# Patient Record
Sex: MALE | Race: WHITE | Employment: FULL TIME | URBAN - METROPOLITAN AREA
[De-identification: names, ages, dates, MRNs, and addresses within clinical notes are randomized per-mention and may not be internally consistent; named-entity substitution may affect disease eponyms.]

---

## 2017-04-03 ENCOUNTER — OFFICE VISIT (OUTPATIENT)
Dept: FAMILY MEDICINE CLINIC | Age: 52
End: 2017-04-03

## 2017-04-03 VITALS
BODY MASS INDEX: 26.35 KG/M2 | OXYGEN SATURATION: 99 % | DIASTOLIC BLOOD PRESSURE: 58 MMHG | HEART RATE: 71 BPM | RESPIRATION RATE: 18 BRPM | SYSTOLIC BLOOD PRESSURE: 108 MMHG | TEMPERATURE: 97.7 F | HEIGHT: 73 IN | WEIGHT: 198.8 LBS

## 2017-04-03 DIAGNOSIS — J45.909 ALLERGIC ASTHMA, UNSPECIFIED ASTHMA SEVERITY, UNCOMPLICATED: ICD-10-CM

## 2017-04-03 DIAGNOSIS — Z00.00 ROUTINE GENERAL MEDICAL EXAMINATION AT A HEALTH CARE FACILITY: Primary | ICD-10-CM

## 2017-04-03 DIAGNOSIS — F32.A CHRONIC DEPRESSION: ICD-10-CM

## 2017-04-03 DIAGNOSIS — Z12.5 SCREENING PSA (PROSTATE SPECIFIC ANTIGEN): ICD-10-CM

## 2017-04-03 DIAGNOSIS — K27.9 PEPTIC ULCER DISEASE: ICD-10-CM

## 2017-04-03 DIAGNOSIS — E79.0 HYPERURICEMIA: ICD-10-CM

## 2017-04-03 NOTE — PROGRESS NOTES
HISTORY OF PRESENT ILLNESS  Elana Ross is a 46 y.o. male. HPI  Annual CPE, fasting for labs w/ physical form for work. Overall has been getting along well and feeling well in general.   No complaints or concerns at this time. Review of Systems   Constitutional: Negative. HENT: Negative. Eyes: Negative. Respiratory: Negative. Asthma under good control, seldom needs rescue inhaler since doing so well on maintenance meds   Cardiovascular: Negative. Gastrointestinal: Negative. Negative for abdominal pain, blood in stool, constipation, diarrhea, heartburn, melena, nausea and vomiting. H/o PUD doing well on Prilosec daily   Genitourinary: Negative. Denies nocturia, weak stream, dribbling or other sx   Musculoskeletal: Negative. Neurological: Negative. Psychiatric/Behavioral: Negative. Doing well on Prozac     Problem List  Date Reviewed: 4/3/2017          Codes Class Noted    Lumbar radiculitis ICD-10-CM: M54.16  ICD-9-CM: 724.4  6/3/2016        S/P epidural steroid injection ICD-10-CM: Z92.241  ICD-9-CM: V45.89  6/3/2016    Overview Signed 6/3/2016 12:05 PM by Eleni Mendez, DO     Left foraminal with Dr. Jian Avalos on 6/1/16             Allergic asthma ICD-10-CM: J45.909  ICD-9-CM: 493.00  2/10/2010        Peptic ulcer disease ICD-10-CM: K27.9  ICD-9-CM: 533.90  2/10/2010        Depression ICD-10-CM: F32.9  ICD-9-CM: 043  2/10/2010        Sleep apnea, obstructive ICD-10-CM: G47.33  ICD-9-CM: 327.23  2/10/2010        Gout ICD-10-CM: M10.9  ICD-9-CM: 274.9  2/10/2010            Past Surgical History:   Procedure Laterality Date    HX COLONOSCOPY  12/22/2014    Normal, follow up 10 yrs, Dr Dada Escobedo  2012    to stretch esophagus    HX VASECTOMY  12/2013     Current Outpatient Prescriptions   Medication Sig    celecoxib (CELEBREX) 100 mg capsule Take 1 Cap by mouth two (2) times a day.     omeprazole (PRILOSEC) 20 mg capsule TAKE ONE CAPSULE BY MOUTH EVERY DAY    montelukast (SINGULAIR) 10 mg tablet TAKE 1 TABLET DAILY    FLUoxetine (PROZAC) 20 mg capsule TAKE 1 CAPSULE DAILY    mometasone (ASMANEX TWISTHALER) 220 mcg (30 doses) inhaler TAKE 1 CAP BY INHALATION DAILY.  allopurinol (ZYLOPRIM) 100 mg tablet TAKE 1 TABLET TWICE A DAY (Patient taking differently: TAKE 1 TABLET DAilY)    saw palmetto 160 mg cap Take  by mouth.  fluticasone (FLONASE) 50 mcg/actuation nasal spray 2 Sprays by Both Nostrils route daily.  therapeutic multivitamin (THERAGRAN) tablet Take 1 Tab by mouth daily.  calcium 500 mg tab Take  by mouth.  Cholecalciferol, Vitamin D3, (VITAMIN D3) 1,000 unit cap Take  by mouth.  fexofenadine (ALLEGRA) 180 mg tablet Take  by mouth daily. No current facility-administered medications for this visit.       No Known Allergies  Social History     Social History    Marital status:      Spouse name: N/A    Number of children: N/A    Years of education: N/A     Occupational History     for [employer]      Social History Main Topics    Smoking status: Never Smoker    Smokeless tobacco: Never Used    Alcohol use 0.5 oz/week     1 Glasses of wine per week      Comment: maybe 1 glass of wine/beer a few times a week    Drug use: No    Sexual activity: Yes     Partners: Female     Birth control/ protection: Surgical     Other Topics Concern    Caffeine Concern No     ice tea 2 x a day, diek Coke once a day    Special Diet No    Back Care Yes     2016-1/2017: Dr Ana Yoon, [facility], PT     Exercise Yes     5 x a week: elliptical x 15 minutes, wts x 45 minutes     Social History Narrative    Wife Alice No is a PAFP of Dr Biju Grajeda     Immunization History   Administered Date(s) Administered    TDAP Vaccine 01/04/2011       Family History   Problem Relation Age of Onset    Diabetes Father     Cancer Mother      breast-mastectomy-2012    Stroke Maternal Aunt      Visit Vitals    /58 (BP 1 Location: Left arm, BP Patient Position: Sitting)    Pulse 71    Temp 97.7 °F (36.5 °C) (Oral)    Resp 18    Ht 6' 1\" (1.854 m)    Wt 198 lb 12.8 oz (90.2 kg)    SpO2 99%    BMI 26.23 kg/m2       Physical Exam   Constitutional: He is oriented to person, place, and time. He appears well-developed and well-nourished. No distress. HENT:   Right Ear: Tympanic membrane normal.   Left Ear: Tympanic membrane normal.   Nose: Nose normal.   Mouth/Throat: Oropharynx is clear and moist. No oropharyngeal exudate. Eyes: Conjunctivae and EOM are normal. Pupils are equal, round, and reactive to light. Neck: Neck supple. No JVD present. Carotid bruit is not present. No thyromegaly present. Cardiovascular: Normal rate, regular rhythm and normal heart sounds. No murmur heard. Pulmonary/Chest: Effort normal and breath sounds normal. No respiratory distress. Abdominal: Soft. Bowel sounds are normal. He exhibits no distension and no mass. There is no tenderness. Genitourinary:   Genitourinary Comments: Deferred    Musculoskeletal: Normal range of motion. He exhibits no edema or tenderness. Lymphadenopathy:     He has no cervical adenopathy. Neurological: He is alert and oriented to person, place, and time. Psychiatric: He has a normal mood and affect. His behavior is normal.   Vitals reviewed. ASSESSMENT and PLAN    ICD-10-CM ICD-9-CM    1. Routine general medical examination at a health care facility Z00.00 V70.0 CBC W/O DIFF      LIPID PANEL      METABOLIC PANEL, COMPREHENSIVE      HEMOGLOBIN A1C WITH EAG      TSH 3RD GENERATION      PROSTATE SPECIFIC AG (PSA)      URINALYSIS W/MICROSCOPIC      URIC ACID   2. Hyperuricemia E79.0 790.6 URIC ACID   3. Allergic asthma, unspecified asthma severity, uncomplicated, controlled, stable J45.909 493.00    4. Chronic depression, stable on Prozac F32.9 311    5. Peptic ulcer disease, controlled on Prilosec K27.9 533.90    6.  Screening PSA (prostate specific antigen) Z12.5 V76.44 PROSTATE SPECIFIC AG (PSA)     Fasting for labs w/ physical form for work which will be completed and faxed to number provided. Reviewed diet, exercise and weight maintenance  Cardiovascular risk and specific lipid/LDL goals reviewed  Reviewed medications and side effects; doing well on current regimen  Further follow up & other recommendations pending review of labs as well  If all remains good and stable, RTC 1 yr CPE.  Follow up sooner prn

## 2017-04-03 NOTE — PATIENT INSTRUCTIONS

## 2017-04-03 NOTE — PROGRESS NOTES
Chief Complaint   Patient presents with    Complete Physical     fasting      1. Have you been to the ER, urgent care clinic since your last visit? Hospitalized since your last visit? [facility] -laceration to left eyelid 8/2016    2. Have you seen or consulted any other health care providers outside of the [address] [place] [address] since your last visit? Include any pap smears or colon screening.    Yes Dr Nathan Motta for cortisone injections-[facility]

## 2017-04-04 LAB
ALBUMIN SERPL-MCNC: 4.6 G/DL (ref 3.5–5.5)
ALBUMIN/GLOB SERPL: 2.1 {RATIO} (ref 1.2–2.2)
ALP SERPL-CCNC: 87 IU/L (ref 39–117)
ALT SERPL-CCNC: 29 IU/L (ref 0–44)
APPEARANCE UR: CLEAR
AST SERPL-CCNC: 40 IU/L (ref 0–40)
BACTERIA #/AREA URNS HPF: NORMAL /[HPF]
BILIRUB SERPL-MCNC: 0.7 MG/DL (ref 0–1.2)
BILIRUB UR QL STRIP: NEGATIVE
BUN SERPL-MCNC: 13 MG/DL (ref 6–24)
BUN/CREAT SERPL: 12 (ref 9–20)
CALCIUM SERPL-MCNC: 9.5 MG/DL (ref 8.7–10.2)
CASTS URNS QL MICRO: NORMAL /LPF
CHLORIDE SERPL-SCNC: 100 MMOL/L (ref 96–106)
CHOLEST SERPL-MCNC: 172 MG/DL (ref 100–199)
CO2 SERPL-SCNC: 22 MMOL/L (ref 18–29)
COLOR UR: YELLOW
CREAT SERPL-MCNC: 1.09 MG/DL (ref 0.76–1.27)
EPI CELLS #/AREA URNS HPF: NORMAL /HPF
ERYTHROCYTE [DISTWIDTH] IN BLOOD BY AUTOMATED COUNT: 13.9 % (ref 12.3–15.4)
EST. AVERAGE GLUCOSE BLD GHB EST-MCNC: 114 MG/DL
GLOBULIN SER CALC-MCNC: 2.2 G/DL (ref 1.5–4.5)
GLUCOSE SERPL-MCNC: 77 MG/DL (ref 65–99)
GLUCOSE UR QL: NEGATIVE
HBA1C MFR BLD: 5.6 % (ref 4.8–5.6)
HCT VFR BLD AUTO: 41.5 % (ref 37.5–51)
HDLC SERPL-MCNC: 51 MG/DL
HGB BLD-MCNC: 13.8 G/DL (ref 12.6–17.7)
HGB UR QL STRIP: NEGATIVE
INTERPRETATION, 910389: NORMAL
KETONES UR QL STRIP: NEGATIVE
LDLC SERPL CALC-MCNC: 99 MG/DL (ref 0–99)
LEUKOCYTE ESTERASE UR QL STRIP: NEGATIVE
MCH RBC QN AUTO: 29.1 PG (ref 26.6–33)
MCHC RBC AUTO-ENTMCNC: 33.3 G/DL (ref 31.5–35.7)
MCV RBC AUTO: 87 FL (ref 79–97)
MICRO URNS: ABNORMAL
MICRO URNS: ABNORMAL
MUCOUS THREADS URNS QL MICRO: PRESENT
NITRITE UR QL STRIP: NEGATIVE
PH UR STRIP: 8 [PH] (ref 5–7.5)
PLATELET # BLD AUTO: 295 X10E3/UL (ref 150–379)
POTASSIUM SERPL-SCNC: 4.2 MMOL/L (ref 3.5–5.2)
PROT SERPL-MCNC: 6.8 G/DL (ref 6–8.5)
PROT UR QL STRIP: NEGATIVE
PSA SERPL-MCNC: 0.4 NG/ML (ref 0–4)
RBC # BLD AUTO: 4.75 X10E6/UL (ref 4.14–5.8)
RBC #/AREA URNS HPF: NORMAL /HPF
SODIUM SERPL-SCNC: 142 MMOL/L (ref 134–144)
SP GR UR: 1.02 (ref 1–1.03)
TRIGL SERPL-MCNC: 109 MG/DL (ref 0–149)
TSH SERPL DL<=0.005 MIU/L-ACNC: 1.55 UIU/ML (ref 0.45–4.5)
URATE SERPL-MCNC: 6 MG/DL (ref 3.7–8.6)
UROBILINOGEN UR STRIP-MCNC: 0.2 MG/DL (ref 0.2–1)
VLDLC SERPL CALC-MCNC: 22 MG/DL (ref 5–40)
WBC # BLD AUTO: 8.5 X10E3/UL (ref 3.4–10.8)
WBC #/AREA URNS HPF: NORMAL /HPF

## 2017-04-06 NOTE — PROGRESS NOTES
Advise pt lipids excellent  BS and HgBA1c good and normal. No diabetes.   Great news, so keep up the good work  Advise I have filled in physical form for work and we will fax that over today  Urine, blood counts, liver, kidney, thyroid, uric acid and prostate all good and wnl  Cont same meds  Fasting CPE 1 yr

## 2017-04-07 ENCOUNTER — TELEPHONE (OUTPATIENT)
Dept: FAMILY MEDICINE CLINIC | Age: 52
End: 2017-04-07

## 2017-04-07 NOTE — TELEPHONE ENCOUNTER
----- Message from Jordan Salter LPN sent at 7/5/3370  2:18 PM EDT -----  Regarding: FW: Test Results Question  Contact: [phone]      ----- Message -----     From: Toña Zavala     Sent: 4/7/2017  10:25 AM       To: [facility] Nurse Pool  Subject: Test Results Question                            Dr. Shivani Hardin, I forgot to ask you about testosterone levels. Was that something that was tested in my blood sample? Just curious if it was low. I seem to be more tired with less energy than I used to have. Do you have any thoughts on that as far as supplements or other alternatives? Thank you for your help.

## 2017-04-11 RX ORDER — FLUTICASONE PROPIONATE 50 MCG
2 SPRAY, SUSPENSION (ML) NASAL DAILY
Qty: 3 BOTTLE | Refills: 3 | Status: SHIPPED | OUTPATIENT
Start: 2017-04-11 | End: 2018-04-23 | Stop reason: SDUPTHER

## 2017-04-11 RX ORDER — FLUOXETINE HYDROCHLORIDE 20 MG/1
20 CAPSULE ORAL DAILY
Qty: 90 CAP | Refills: 3 | Status: SHIPPED | OUTPATIENT
Start: 2017-04-11 | End: 2018-05-29 | Stop reason: SDUPTHER

## 2017-04-11 NOTE — TELEPHONE ENCOUNTER
Patient saw FLAURO for a complete physical on 04/03/2017 did she agree to take him as a new patient?

## 2017-04-11 NOTE — TELEPHONE ENCOUNTER
From: Mariangel Zambrano  To: Jhonny Mercedes DO  Sent: 4/10/2017 9:47 AM EDT  Subject: Medication Renewal Request    Original authorizing provider: DO Mariangel Morocho would like a refill of the following medications:  fluticasone (FLONASE) 50 mcg/actuation nasal spray [Pennie Bartholomew DO]  FLUoxetine (PROZAC) 20 mg capsule Jhonny Mercedes DO]    Preferred pharmacy: [facility]:  Since Dr. Maximiliano Sidhu has left, I need a new family practice  and someone to request a refill with [facility] for the two prescriptions noted above.  Thanks

## 2017-04-12 NOTE — TELEPHONE ENCOUNTER
Thanks Shayla Beyer. Sure. He has been tossed around to 4 different PCPs in the past 6 years (Addie Mcallister, Katherine Perez), that is the least I can do is take him on if he wants. I am his wife's PCP. So if pt wants me to be PCP, I am happy to do so. Either way can advise him I am sending in his rx renewals x 1 year. Thanks.

## 2017-07-11 RX ORDER — OMEPRAZOLE 20 MG/1
CAPSULE, DELAYED RELEASE ORAL
Qty: 90 CAP | Refills: 3 | Status: SHIPPED | OUTPATIENT
Start: 2017-07-11 | End: 2018-07-23 | Stop reason: SDUPTHER

## 2017-07-11 NOTE — TELEPHONE ENCOUNTER
Received faxed refill request for this medication from the pharmacy that is on file. Requesting a 90 day supply.     omeprazole (PRILOSEC) 20 mg capsule  TAKE ONE CAPSULE BY MOUTH EVERY DAY, Normal, Disp-90 Cap, R-3

## 2017-08-09 ENCOUNTER — OFFICE VISIT (OUTPATIENT)
Dept: FAMILY MEDICINE CLINIC | Age: 52
End: 2017-08-09

## 2017-08-09 VITALS
TEMPERATURE: 97.9 F | WEIGHT: 201 LBS | SYSTOLIC BLOOD PRESSURE: 120 MMHG | HEIGHT: 73 IN | BODY MASS INDEX: 26.64 KG/M2 | HEART RATE: 51 BPM | DIASTOLIC BLOOD PRESSURE: 64 MMHG | OXYGEN SATURATION: 98 % | RESPIRATION RATE: 18 BRPM

## 2017-08-09 DIAGNOSIS — M65.4 DE QUERVAIN'S TENOSYNOVITIS, LEFT: ICD-10-CM

## 2017-08-09 DIAGNOSIS — M79.645 PAIN OF LEFT THUMB: Primary | ICD-10-CM

## 2017-08-09 NOTE — PROGRESS NOTES
HISTORY OF PRESENT ILLNESS   HPI  Right handed  w/ 2 month h/o aching pain in base of left thumb. Described as an aching pain. Constant. Rates 4-5/10 unless he does something to aggravate it the pain goes up to an 8/10. Aggravated by gripping, picking things up or turning/twisting a certain way. No redness or swelling. No stiffness, weakness or paresthesias. No preceding injury/trauma. He started [employer] lessons around the end of March and practices a lot. He has to use the left thumb a lot to play. He took a break from that about 2 weeks ago. He still however works out at [employer]. He takes Celebrex bid prn his back pain and has been using that at times. Other times he takes a few Advil for instance after working out and that helps. He has iced it periodically which also helps some. REVIEW OF SYMPTOMS     Review of Systems   Constitutional: Negative.     Neurological: Negative.            PROBLEM LIST/MEDICAL HISTORY      Problem List  Date Reviewed: 8/9/2017          Codes Class Noted    Lumbar radiculitis ICD-10-CM: M54.16  ICD-9-CM: 724.4  6/3/2016        S/P epidural steroid injection ICD-10-CM: Z92.241  ICD-9-CM: V45.89  6/3/2016    Overview Signed 6/3/2016 12:05 PM by Kalina Torres DO     Left foraminal with Dr. Conner Hassan on 6/1/16             Allergic asthma ICD-10-CM: J45.909  ICD-9-CM: 493.00  2/10/2010        Peptic ulcer disease ICD-10-CM: K27.9  ICD-9-CM: 533.90  2/10/2010        Depression ICD-10-CM: F32.9  ICD-9-CM: 319  2/10/2010        Sleep apnea, obstructive ICD-10-CM: G47.33  ICD-9-CM: 327.23  2/10/2010        Gout ICD-10-CM: M10.9  ICD-9-CM: 274.9  2/10/2010                  PAST SURGICAL HISTORY       Past Surgical History:   Procedure Laterality Date    HX COLONOSCOPY  12/22/2014    Normal, follow up 10 yrs, Dr Tutu Conway  2012    to stretch esophagus    HX VASECTOMY  12/2013         MEDICATIONS      Current Outpatient Prescriptions   Medication Sig  omeprazole (PRILOSEC) 20 mg capsule TAKE ONE CAPSULE BY MOUTH EVERY DAY    montelukast (SINGULAIR) 10 mg tablet Take 1 Tab by mouth daily.  celecoxib (CELEBREX) 100 mg capsule Take 1 Cap by mouth two (2) times a day.  fluticasone (FLONASE) 50 mcg/actuation nasal spray 2 Sprays by Both Nostrils route daily.  FLUoxetine (PROZAC) 20 mg capsule Take 1 Cap by mouth daily.  mometasone (ASMANEX TWISTHALER) 220 mcg (30 doses) inhaler TAKE 1 CAP BY INHALATION DAILY.  allopurinol (ZYLOPRIM) 100 mg tablet TAKE 1 TABLET TWICE A DAY (Patient taking differently: TAKE 1 TABLET DAilY)    therapeutic multivitamin (THERAGRAN) tablet Take 1 Tab by mouth daily.  calcium 500 mg tab Take  by mouth.  Cholecalciferol, Vitamin D3, (VITAMIN D3) 1,000 unit cap Take  by mouth. No current facility-administered medications for this visit.            ALLERGIES   No Known Allergies       SOCIAL HISTORY       Social History     Social History    Marital status:      Spouse name: N/A    Number of children: N/A    Years of education: N/A     Occupational History     for [employer]      Social History Main Topics    Smoking status: Never Smoker    Smokeless tobacco: Never Used    Alcohol use 0.5 oz/week     1 Glasses of wine per week      Comment: maybe 1 glass of wine/beer a few times a week    Drug use: No    Sexual activity: Yes     Partners: Female     Birth control/ protection: Surgical     Other Topics Concern    Caffeine Concern No     ice tea 2 x a day, diek Coke once a day    Special Diet No    Back Care Yes     2016-1/2017: Dr Isabela Laguna, [facility], PT     Exercise Yes     5 x a week: elliptical x 15 minutes, wts x 45 minutes     Social History Narrative    Wife Bianca Patiño is a PAFP of Dr Johanne Lieberman        IMMUNIZATIONS  Immunization History   Administered Date(s) Administered    TDAP Vaccine 01/04/2011         FAMILY HISTORY     Family History   Problem Relation Age of Onset    Diabetes Father  Cancer Mother      breast-mastectomy-2012    Stroke Maternal Aunt          VITALS     Visit Vitals    /64 (BP 1 Location: Left arm, BP Patient Position: Sitting)    Pulse (!) 51    Temp 97.9 °F (36.6 °C) (Oral)    Resp 18    Ht 6' 1\" (1.854 m)    Wt 201 lb (91.2 kg)    SpO2 98%    BMI 26.52 kg/m2          PHYSICAL EXAMINATION     Physical Exam   Constitutional: No distress. Cardiovascular: Intact distal pulses. Musculoskeletal:   Left thumb/wrist exam: No joint effusions, warmth, erythema, swelling or discoloration. Tender at base of left thumb along EPB/ APL tendons. +Finkelsteins.  intact and symmetric to the right   Vitals reviewed.          DIAGNOSTIC DATA      Study Result   EXAM:  XR THUMB LT MIN 2 V     INDICATION:   chronic left thumb pain. No injury.     COMPARISON: None.     FINDINGS: Three views of the left thumb demonstrate no fracture or other acute  osseous or articular abnormality. The soft tissues are within normal limits. Joints are within normal limits. Bone mineralization is within normal limits.     IMPRESSION  IMPRESSION:        Normal left thumb views            ASSESSMENT & PLAN       ICD-10-CM ICD-9-CM    1. Pain of left thumb M79.645 729.5 XR THUMB LT MIN 2 V   2. De Quervain's tenosynovitis, left M65.4 727.04      Order for thumb spica splint  Ice as directed  Resume his Celebrex 100 mg bid w/ food for now and taper as sx improve  Reassess at ~ 2-3 weeks, sooner prn  If symptoms worsen or fail to improve, will refer to hand ortho and this was d/w pt today  Patient counseled about diagnosis, assessment, treatment plan and expresses understanding with plan of care.    [facility] counseling and exercises d/w pt, handouts given

## 2017-08-09 NOTE — PATIENT INSTRUCTIONS
Maritza Sayres Disease: Exercises  Your Care Instructions  Here are some examples of typical rehabilitation exercises for your condition. Start each exercise slowly. Ease off the exercise if you start to have pain. Your doctor or your physical or occupational therapist will tell you when you can start these exercises and which ones will work best for you. How to do the exercises  Thumb lifts    1. Place your hand on a flat surface, with your palm up. 2. Lift your thumb away from your palm to make a \"C\" shape. 3. Hold for about 6 seconds. 4. Repeat 8 to 12 times. Passive thumb MP flexion    1. Hold your hand in front of you, and turn your hand so your little finger faces down and your thumb faces up. (Your hand should be in the position used for shaking someone's hand.) You may also rest your hand on a flat surface. 2. Use the fingers on your other hand to bend your thumb down at the point where your thumb connects to your palm. 3. Hold for at least 15 to 30 seconds. 4. Repeat 2 to 4 times. Finkelstein stretch    1. Hold your arms out in front of you. (Your hand should be in the position used for shaking someone's hand.)  2. Bend your thumb toward your palm. 3. Use your other hand to gently stretch your thumb and wrist downward until you feel the stretch on the thumb side of your wrist.  4. Hold for at least 15 to 30 seconds. 5. Repeat 2 to 4 times. Resisted ulnar deviation    Note: For this exercise, you will need elastic exercise material, such as surgical tubing or Thera-Band. 1. Sit leaning forward with your legs slightly spread and your elbow on your thigh. 2. Grasp one end of the band with your palm down, and step on the other end with the foot opposite the hand holding the band. 3. Slowly bend your wrist sideways and away from your knee. 4. Repeat 8 to 12 times. Follow-up care is a key part of your treatment and safety.  Be sure to make and go to all appointments, and call your doctor if you are having problems. It's also a good idea to know your test results and keep a list of the medicines you take. Where can you learn more? Go to http://kae-patric.info/. Enter U814 in the search box to learn more about \"De Quervain's Disease: Exercises. \"  Current as of: March 21, 2017  Content Version: 11.3  © 5513-4049 [employer]. Care instructions adapted under license by [employer] (which disclaims liability or warranty for this information). If you have questions about a medical condition or this instruction, always ask your healthcare professional. [address] any warranty or liability for your use of this information. Santino Poe Tenosynovitis: Care Instructions  Your Care Instructions  Santino Poe (say \"Dania\") tenosynovitis is a problem that makes the bottom of your thumb and the side of your wrist hurt. When you have de Quervain's, the ropey fiber (tendon) that helps move your thumb away from your fingers becomes swollen. You may have pain when you move your wrist or pick things up. You may hear a creaking sound when you move your wrist or thumb. Symptoms often get better in a few weeks with home care. Your doctor may want you to start some gentle stretching exercises once your symptoms are gone. Sometimes treatment with an injection or surgery is needed. Follow-up care is a key part of your treatment and safety. Be sure to make and go to all appointments, and call your doctor if you are having problems. It's also a good idea to know your test results and keep a list of the medicines you take. How can you care for yourself at home? · Until your symptoms are better, stop the activities that caused the pain. · Avoid moving the hand and wrist that hurt. · Follow your doctor's directions for wearing a splint to keep your thumb and wrist from moving. · Try ice or heat.   ¨ Put ice or a cold pack on your thumb and wrist for 10 to 20 minutes at a time. Put a thin cloth between the ice and your skin. ¨ You can use heat for 20 to 30 minutes, 2 or 3 times a day. Try using a heating pad, hot shower, or hot pack. · Ask your doctor if you can take an over-the-counter pain medicine, such as acetaminophen (Tylenol), ibuprofen (Advil, Motrin), or naproxen (Aleve). Be safe with medicines. Read and follow all instructions on the label. When should you call for help? Watch closely for changes in your health, and be sure to contact your doctor if:  · You are not getting better after 2 weeks. Where can you learn more? Go to http://kae-patric.info/. Enter G133 in the search box to learn more about \"De Quervain's Tenosynovitis: Care Instructions. \"  Current as of: May 23, 2016  Content Version: 11.3  © 5165-1822 [employer]. Care instructions adapted under license by [employer] (which disclaims liability or warranty for this information). If you have questions about a medical condition or this instruction, always ask your healthcare professional. [address] any warranty or liability for your use of this information.

## 2017-08-09 NOTE — MR AVS SNAPSHOT
Visit Information Date & Time Provider Department Dept. Phone Encounter #  
 8/9/2017 12:00 PM Iain Richard  [facility] [phone] [id number] Upcoming Health Maintenance Date Due INFLUENZA AGE 9 TO ADULT 8/1/2017 DTaP/Tdap/Td series (2 - Td) 1/4/2021 COLONOSCOPY 12/22/2024 Allergies as of 8/9/2017  Review Complete On: 8/9/2017 By: [address], [place]  
 No Known Allergies Current Immunizations  Reviewed on 4/3/2017 Name Date TDAP Vaccine 1/4/2011 Not reviewed this visit You Were Diagnosed With   
  
 Codes Comments Pain of left thumb    -  Primary ICD-10-CM: W50.824 ICD-9-CM: 729.5 De Quervain's tenosynovitis, left     ICD-10-CM: M65.4 ICD-9-CM: 727.04 Vitals BP Pulse Temp Resp Height(growth percentile) Weight(growth percentile) 120/64 (BP 1 Location: Left arm, BP Patient Position: Sitting) (!) 51 97.9 °F (36.6 °C) (Oral) 18 6' 1\" (1.854 m) 201 lb (91.2 kg) SpO2 BMI Smoking Status 98% 26.52 kg/m2 Never Smoker BMI and BSA Data Body Mass Index Body Surface Area  
 26.52 kg/m 2 2.17 m 2 Preferred Pharmacy Pharmacy Name Phone [address] [facility] [phone] Your Updated Medication List  
  
   
This list is accurate as of: 8/9/17 12:26 PM.  Always use your most recent med list.  
  
  
  
  
 allopurinol 100 mg tablet Commonly known as:  ZYLOPRIM  
TAKE 1 TABLET TWICE A DAY  
  
 calcium 500 mg Tab Take  by mouth. celecoxib 100 mg capsule Commonly known as:  CeleBREX Take 1 Cap by mouth two (2) times a day. FLUoxetine 20 mg capsule Commonly known as:  PROzac Take 1 Cap by mouth daily. fluticasone 50 mcg/actuation nasal spray Commonly known as:  Erich Steuben 2 Sprays by Both Nostrils route daily. mometasone 220 mcg (30 doses) inhaler Commonly known as:  ASMANEX TWISTHALER  
TAKE 1 CAP BY INHALATION DAILY. montelukast 10 mg tablet Commonly known as:  SINGULAIR Take 1 Tab by mouth daily. omeprazole 20 mg capsule Commonly known as:  PRILOSEC  
TAKE ONE CAPSULE BY MOUTH EVERY DAY  
  
 therapeutic multivitamin tablet Commonly known as:  [facility] Take 1 Tab by mouth daily. VITAMIN D3 1,000 unit Cap Generic drug:  cholecalciferol Take  by mouth. To-Do List   
 08/09/2017 Imaging:  XR THUMB LT MIN 2 V Patient Instructions Nabil Ellington Disease: Exercises Your Care Instructions Here are some examples of typical rehabilitation exercises for your condition. Start each exercise slowly. Ease off the exercise if you start to have pain. Your doctor or your physical or occupational therapist will tell you when you can start these exercises and which ones will work best for you. How to do the exercises Thumb lifts 1. Place your hand on a flat surface, with your palm up. 2. Lift your thumb away from your palm to make a \"C\" shape. 3. Hold for about 6 seconds. 4. Repeat 8 to 12 times. Passive thumb MP flexion 1. Hold your hand in front of you, and turn your hand so your little finger faces down and your thumb faces up. (Your hand should be in the position used for shaking someone's hand.) You may also rest your hand on a flat surface. 2. Use the fingers on your other hand to bend your thumb down at the point where your thumb connects to your palm. 3. Hold for at least 15 to 30 seconds. 4. Repeat 2 to 4 times. Finkelstein stretch 1. Hold your arms out in front of you. (Your hand should be in the position used for shaking someone's hand.) 2. Bend your thumb toward your palm. 3. Use your other hand to gently stretch your thumb and wrist downward until you feel the stretch on the thumb side of your wrist. 
4. Hold for at least 15 to 30 seconds. 5. Repeat 2 to 4 times. Resisted ulnar deviation Note: For this exercise, you will need elastic exercise material, such as surgical tubing or Thera-Band. 1. Sit leaning forward with your legs slightly spread and your elbow on your thigh. 2. Grasp one end of the band with your palm down, and step on the other end with the foot opposite the hand holding the band. 3. Slowly bend your wrist sideways and away from your knee. 4. Repeat 8 to 12 times. Follow-up care is a key part of your treatment and safety. Be sure to make and go to all appointments, and call your doctor if you are having problems. It's also a good idea to know your test results and keep a list of the medicines you take. Where can you learn more? Go to http://kae-patric.info/. Enter C432 in the search box to learn more about \"De Quervain's Disease: Exercises. \" Current as of: March 21, 2017 Content Version: 11.3 © 2274-2678 [employer]. Care instructions adapted under license by [employer] (which disclaims liability or warranty for this information). If you have questions about a medical condition or this instruction, always ask your healthcare professional. [address] any warranty or liability for your use of this information. Marilyn Viramontes Tenosynovitis: Care Instructions Your Care Instructions Marilyn Viramontes (say \"Dania\") tenosynovitis is a problem that makes the bottom of your thumb and the side of your wrist hurt. When you have de Quervain's, the ropey fiber (tendon) that helps move your thumb away from your fingers becomes swollen. You may have pain when you move your wrist or pick things up. You may hear a creaking sound when you move your wrist or thumb. Symptoms often get better in a few weeks with home care. Your doctor may want you to start some gentle stretching exercises once your symptoms are gone. Sometimes treatment with an injection or surgery is needed. Follow-up care is a key part of your treatment and safety. Be sure to make and go to all appointments, and call your doctor if you are having problems. It's also a good idea to know your test results and keep a list of the medicines you take. How can you care for yourself at home? · Until your symptoms are better, stop the activities that caused the pain. · Avoid moving the hand and wrist that hurt. · Follow your doctor's directions for wearing a splint to keep your thumb and wrist from moving. · Try ice or heat. ¨ Put ice or a cold pack on your thumb and wrist for 10 to 20 minutes at a time. Put a thin cloth between the ice and your skin. ¨ You can use heat for 20 to 30 minutes, 2 or 3 times a day. Try using a heating pad, hot shower, or hot pack. · Ask your doctor if you can take an over-the-counter pain medicine, such as acetaminophen (Tylenol), ibuprofen (Advil, Motrin), or naproxen (Aleve). Be safe with medicines. Read and follow all instructions on the label. When should you call for help? Watch closely for changes in your health, and be sure to contact your doctor if: 
· You are not getting better after 2 weeks. Where can you learn more? Go to http://kae-patric.info/. Enter Z963 in the search box to learn more about \"De Quervain's Tenosynovitis: Care Instructions. \" Current as of: May 23, 2016 Content Version: 11.3 © 2218-6115 [employer]in[employer] Incorporated. Care instructions adapted under license by [employer] (which disclaims liability or warranty for this information). If you have questions about a medical condition or this instruction, always ask your healthcare professional. [address] any warranty or liability for your use of this information. Introducing [facility]! Dear Laymon Showers: Thank you for requesting a [employer] account. Our records indicate that you already have an active [employer] account.   You can access your account anytime at [email or web address] Did you know that you can access your hospital and ER discharge instructions at any time in [employer]? You can also review all of your test results from your hospital stay or ER visit. Additional Information If you have questions, please visit the Frequently Asked Questions section of the [employer] website at [email or web address] Remember, Fanchimp is NOT to be used for urgent needs. For medical emergencies, dial 911. Now available from your iPhone and Android! Please provide this summary of care documentation to your next provider. Your primary care clinician is listed as DEVI AGUIRRE. If you have any questions after today's visit, please call [phone].

## 2017-08-09 NOTE — PROGRESS NOTES
Chief Complaint   Patient presents with    Finger Pain     left thumb pain for 2 months - constant - sometimes worse than others-states \" throbbing ache\" and difficult to  big items     1. Have you been to the ER, urgent care clinic since your last visit? Hospitalized since your last visit? No    2. Have you seen or consulted any other health care providers outside of the [address] [place] [address] since your last visit? Include any pap smears or colon screening.  No

## 2017-10-02 DIAGNOSIS — J45.20 ALLERGIC ASTHMA, MILD INTERMITTENT, UNCOMPLICATED: ICD-10-CM

## 2017-10-03 RX ORDER — ALLOPURINOL 100 MG/1
100 TABLET ORAL DAILY
Qty: 90 TAB | Refills: 2 | Status: SHIPPED | OUTPATIENT
Start: 2017-10-03 | End: 2018-10-29 | Stop reason: SDUPTHER

## 2017-10-03 NOTE — TELEPHONE ENCOUNTER
Jose Grant would like a refill of the following medications:   allopurinol (ZYLOPRIM) 100 mg tablet [Pennie Bartholomew DO]   mometasone (ASMANEX TWISTHALER) 220 mcg (30 doses) inhaler Yolanda Sanchez DO]     Preferred pharmacy: [place]     Comment:   I still need Dr. Florence Garcia to contact [facility] to renew my prescriptions for allopurinol and asmanex twisthaler as soon as possible please.  Thank you.

## 2017-10-30 ENCOUNTER — TELEPHONE (OUTPATIENT)
Dept: FAMILY MEDICINE CLINIC | Age: 52
End: 2017-10-30

## 2017-10-30 NOTE — TELEPHONE ENCOUNTER
Called [facility] to get approval for the celebrex. It was approved for 1 year. The case ID # is [id number].

## 2018-04-23 RX ORDER — FLUTICASONE PROPIONATE 50 MCG
SPRAY, SUSPENSION (ML) NASAL
Qty: 48 G | Refills: 3 | Status: SHIPPED | OUTPATIENT
Start: 2018-04-23 | End: 2020-09-24 | Stop reason: ALTCHOICE

## 2018-05-15 ENCOUNTER — OFFICE VISIT (OUTPATIENT)
Dept: FAMILY MEDICINE CLINIC | Age: 53
End: 2018-05-15

## 2018-05-15 VITALS
OXYGEN SATURATION: 98 % | BODY MASS INDEX: 26.74 KG/M2 | DIASTOLIC BLOOD PRESSURE: 64 MMHG | RESPIRATION RATE: 18 BRPM | SYSTOLIC BLOOD PRESSURE: 110 MMHG | TEMPERATURE: 98.4 F | HEIGHT: 73 IN | WEIGHT: 201.8 LBS | HEART RATE: 54 BPM

## 2018-05-15 DIAGNOSIS — G47.33 OSA (OBSTRUCTIVE SLEEP APNEA): ICD-10-CM

## 2018-05-15 DIAGNOSIS — F32.A CHRONIC DEPRESSION: ICD-10-CM

## 2018-05-15 DIAGNOSIS — Z12.5 SCREENING PSA (PROSTATE SPECIFIC ANTIGEN): ICD-10-CM

## 2018-05-15 DIAGNOSIS — E79.0 HYPERURICEMIA: ICD-10-CM

## 2018-05-15 DIAGNOSIS — Z00.00 ROUTINE GENERAL MEDICAL EXAMINATION AT A HEALTH CARE FACILITY: Primary | ICD-10-CM

## 2018-05-15 DIAGNOSIS — R53.82 CHRONIC FATIGUE: ICD-10-CM

## 2018-05-15 DIAGNOSIS — J45.30 MILD PERSISTENT ASTHMA WITHOUT COMPLICATION: ICD-10-CM

## 2018-05-15 DIAGNOSIS — K27.9 PEPTIC ULCER DISEASE: ICD-10-CM

## 2018-05-15 PROBLEM — M51.36 DDD (DEGENERATIVE DISC DISEASE), LUMBAR: Status: ACTIVE | Noted: 2018-05-15

## 2018-05-15 PROBLEM — G89.29 CHRONIC LOW BACK PAIN WITH LEFT-SIDED SCIATICA: Status: ACTIVE | Noted: 2018-05-15

## 2018-05-15 PROBLEM — M54.42 CHRONIC LOW BACK PAIN WITH LEFT-SIDED SCIATICA: Status: ACTIVE | Noted: 2018-05-15

## 2018-05-15 PROBLEM — M51.26 PROTRUSION OF LUMBAR INTERVERTEBRAL DISC: Status: ACTIVE | Noted: 2018-05-15

## 2018-05-15 RX ORDER — FEXOFENADINE HCL AND PSEUDOEPHEDRINE HCI 180; 240 MG/1; MG/1
1 TABLET, EXTENDED RELEASE ORAL DAILY
COMMUNITY
End: 2018-05-15

## 2018-05-15 RX ORDER — BISMUTH SUBSALICYLATE 262 MG
1 TABLET,CHEWABLE ORAL DAILY
COMMUNITY
End: 2022-03-30 | Stop reason: ALTCHOICE

## 2018-05-15 RX ORDER — LORATADINE 10 MG/1
10 TABLET ORAL DAILY
COMMUNITY
End: 2019-05-22

## 2018-05-15 NOTE — PROGRESS NOTES
HISTORY OF PRESENT ILLNESS   HPI  Annual CPE fasting and follow up on chronic conditions detailed below. Overall patient has been getting along well and feeling pretty good in general.  He has h/o chronic depression that has been well controlled on Prozac daily. Also has h/o ISMAEL diagnosed ~ 10 yrs ago but intolerant of CPAP (so not using). He does not feel that has changed or worsened to any degree and for the most part feels he rests well at night. Wife not complaining of loud snoring and no witnessed apnea. He has chronic \"low grade fatigue\" which doesn't interfere w/ his daily activities, social life, or exercise habits. He would like to have his testosterone level checked to be sure that is not contributing. He tends to have decreased libido in general, that has not worsened. However when being sexually active w/ wife he has no ED issues. He denies any  sx. He follows a pretty sensible diet and continues to exercise several days a week w/o any problems. Denies rest or exertional CP, SOB, cough, wheeze or other sx. He uses his Asmanex inhaler daily and with doing so he does not need to use Albuterol prior to exercise. He seldom needs the Albuterol any during the year. He takes Celebrex mostly just once a day and that keeps his chronic low back pain at a very tolerable level. On rare occasion he may take a second one later in the day. Taking Prilosec daily and being on the Celebrex keeps his PUD from flaring like it did in the past when he took high doses of Advil. Current regimen working well for him. No gout flares in a long time w/ taking the Allopurinol daily. REVIEW OF SYMPTOMS     Review of Systems   Constitutional: Negative for chills, fever and weight loss. HENT: Negative. Eyes: Negative. Respiratory: Negative. Cardiovascular: Negative. Gastrointestinal: Negative. Negative for abdominal pain, blood in stool, constipation, diarrhea, heartburn, nausea and vomiting. Genitourinary: Negative for dysuria, frequency, hematuria and urgency. Denies scrotal masses, urinary frequency, weak stream, nocturia or other  complaints. Skin: Negative. Neurological: Negative. Negative for weakness. Endo/Heme/Allergies: Positive for environmental allergies (takes either Claritin or Allegra daily year round to keep his allergies under control). Psychiatric/Behavioral: Negative for depression. The patient is not nervous/anxious and does not have insomnia. Doing well on Prozac daily           PROBLEM LIST/MEDICAL HISTORY      Problem List  Date Reviewed: 5/15/2018          Codes Class Noted    ISMAEL (obstructive sleep apnea) ICD-10-CM: C82.18  ICD-9-CM: 327.23  5/15/2018    Overview Signed 5/15/2018 12:09 PM by Tay Rodriguez MD     ~7646; Intolerant of CPAP             Hyperuricemia ICD-10-CM: E79.0  ICD-9-CM: 790.6  5/15/2018        Mild persistent asthma without complication JII-31-TT: A60.43  ICD-9-CM: 493.90  5/15/2018        Chronic low back pain with left-sided sciatica ICD-10-CM: M54.42, G89.29  ICD-9-CM: 724.2, 724.3, 338.29  5/15/2018    Overview Signed 5/15/2018  1:54 PM by Tay Rodriguez MD     MRI 2016: Mild multilevel disc and facet degenerative change focused at L5-S1. Moderate broad-based protrusion at L5-S1 with mild canal stenosis and mild   effacement of nerve is extending to both the right and left S1 foramina.                Protrusion of lumbar intervertebral disc ICD-10-CM: M51.26  ICD-9-CM: 722.10  5/15/2018    Overview Signed 5/15/2018  1:54 PM by Tay Rodriguez MD     MRI 2016: Mild multilevel disc and facet degenerative change focused at L5-S1. Moderate broad-based protrusion at L5-S1 with mild canal stenosis and mild   effacement of nerve is extending to both the right and left S1 foramina.              DDD (degenerative disc disease), lumbar ICD-10-CM: M51.36  ICD-9-CM: 722.52  5/15/2018        Lumbar radiculitis ICD-10-CM: M54.16  ICD-9-CM: 724.4  6/3/2016        S/P epidural steroid injection ICD-10-CM: Z92.241  ICD-9-CM: V45.89  6/3/2016    Overview Signed 6/3/2016 12:05 PM by Rosalba Mane DO     Left foraminal with Dr. Vibha Muller on 6/1/16             Allergic asthma ICD-10-CM: J45.909  ICD-9-CM: 493.00  2/10/2010        Peptic ulcer disease ICD-10-CM: K27.9  ICD-9-CM: 533.90  2/10/2010        Depression ICD-10-CM: F32.9  ICD-9-CM: 765  2/10/2010        Gout ICD-10-CM: M10.9  ICD-9-CM: 274.9  2/10/2010                  PAST SURGICAL HISTORY       Past Surgical History:   Procedure Laterality Date    HX COLONOSCOPY  12/22/2014    Normal, follow up 10 yrs, Dr Macho Morrow  2012    to stretch esophagus    HX VASECTOMY  12/2013         MEDICATIONS      Current Outpatient Prescriptions   Medication Sig    multivitamin (ONE A DAY) tablet Take 1 Tab by mouth daily.  loratadine (CLARITIN) 10 mg tablet Take 10 mg by mouth daily.  fluticasone (FLONASE) 50 mcg/actuation nasal spray USE 2 SPRAYS IN EACH NOSTRIL DAILY    montelukast (SINGULAIR) 10 mg tablet TAKE 1 TABLET DAILY    allopurinol (ZYLOPRIM) 100 mg tablet Take 1 Tab by mouth daily.  mometasone (ASMANEX TWISTHALER) 220 mcg (30 doses) inhaler TAKE 1 CAP BY INHALATION DAILY.  omeprazole (PRILOSEC) 20 mg capsule TAKE ONE CAPSULE BY MOUTH EVERY DAY    celecoxib (CELEBREX) 100 mg capsule Take 1 Cap by mouth two (2) times a day.  FLUoxetine (PROZAC) 20 mg capsule Take 1 Cap by mouth daily.  calcium 500 mg tab Take  by mouth daily.  Cholecalciferol, Vitamin D3, (VITAMIN D3) 1,000 unit cap Take 1,000 Units by mouth daily. No current facility-administered medications for this visit.            ALLERGIES   No Known Allergies       SOCIAL HISTORY       Social History     Social History    Marital status:      Spouse name: N/A    Number of children: N/A    Years of education: N/A     Occupational History     for [employer] Social History Main Topics    Smoking status: Never Smoker    Smokeless tobacco: Never Used    Alcohol use 0.5 oz/week     1 Glasses of wine per week      Comment: maybe 1 glass of wine/beer a few times a week    Drug use: No    Sexual activity: Yes     Partners: Female     Birth control/ protection: Surgical     Other Topics Concern    Caffeine Concern No     ice tea 2 x a day, diek Coke once a day    Special Diet No    Back Care Yes     2016-1/2017: Dr Cony Cooper, [facility], PT     Exercise Yes     5-6 x a week: elliptical x 15 minutes, wts x 45 minutes     Social History Narrative    Wife Kameron Power is a PAFP of Dr Jhony Lugo  Immunization History   Administered Date(s) Administered    TDAP Vaccine 01/04/2011         FAMILY HISTORY     Family History   Problem Relation Age of Onset    Diabetes Father     Cancer Mother 80     bladder cancer removed 1-2018    Breast Cancer Mother      mastectomy 2012    No Known Problems Sister     No Known Problems Brother     Stroke Maternal Aunt          VITALS     Visit Vitals    /64 (BP 1 Location: Right arm, BP Patient Position: Sitting)    Pulse (!) 54    Temp 98.4 °F (36.9 °C) (Oral)    Resp 18    Ht 6' 1\" (1.854 m)    Wt 201 lb 12.8 oz (91.5 kg)    SpO2 98%    BMI 26.62 kg/m2          PHYSICAL EXAMINATION     Physical Exam   Constitutional: He is oriented to person, place, and time and well-developed, well-nourished, and in no distress. HENT:   Nose: Nose normal.   Mouth/Throat: Oropharynx is clear and moist. No oropharyngeal exudate. Eyes: Conjunctivae and EOM are normal. Pupils are equal, round, and reactive to light. Neck: Neck supple. No JVD present. Carotid bruit is not present. No thyromegaly present. Cardiovascular: Normal rate, regular rhythm and normal heart sounds. No murmur heard. Pulmonary/Chest: Effort normal and breath sounds normal.   Abdominal: Soft.  Bowel sounds are normal. He exhibits no distension and no mass. There is no tenderness. Genitourinary:   Genitourinary Comments: Deferred    Musculoskeletal: Normal range of motion. He exhibits no edema or tenderness. Lymphadenopathy:     He has no cervical adenopathy. Neurological: He is alert and oriented to person, place, and time. He has normal reflexes. Gait normal. Coordination normal.   Skin: Skin is warm and dry. Psychiatric: Mood, affect and judgment normal.   Vitals reviewed.              ASSESSMENT & PLAN       ICD-10-CM ICD-9-CM    1. Routine general medical examination at a health care facility Z00.00 V70.0 CBC WITH AUTOMATED DIFF      LIPID PANEL      METABOLIC PANEL, COMPREHENSIVE      TSH 3RD GENERATION      URIC ACID      PSA W/ REFLX FREE PSA      TESTOSTERONE, FREE & TOTAL   2. Hyperuricemia E79.0 790.6 URIC ACID; cont Allopurinol   3. Chronic depression F32.9 311    4. Chronic fatigue R53.82 780.79 CBC WITH AUTOMATED DIFF      METABOLIC PANEL, COMPREHENSIVE      TSH 3RD GENERATION      TESTOSTERONE, FREE & TOTAL   5. ISMAEL (obstructive sleep apnea) G47.33 327.23 Intolerant of CPAP over the years. 6. Peptic ulcer disease K27.9 533.90 Stable on Prilosec daily   7. Screening PSA (prostate specific antigen) Z12.5 V76.44 PSA W/ REFLX FREE PSA   8. Mild persistent asthma without complication P22.34 651.94 Controlled on maintenance Asmanex     Fasting labs today  Patient brought in health form for work to be completed and faxed when labs back. Reviewed diet, nutrition, exercise, weight management, age/risk based screening recommendations, health maintenance & prevention counseling. Patient counseled about current BMI, goals, diet, nutrition, exercise, weight management. Nutrition/meal planning discussed. Monitor weights. Appropriate patient education materials included with or without corresponding AVS via handout or MyChart if activated. Reassess at next follow up visit.    Cardiovascular risk and specific lipid/LDL goals reviewed  Reviewed medications and side effects in detail. Doing well on current regimen. No changes at this time. Further follow up & other recommendations pending review of labs. TRT counseling. Discussed w/ pt that if T level is low, will have him RTC for repeat testosterone testing first thing in the AM at 8 as well as LH testing.   Otherwise if all remains good and stable, RTC 1 yr next annual CPE and sooner in the interim prn w/ periodic monitoring

## 2018-05-15 NOTE — PROGRESS NOTES
Chief Complaint   Patient presents with    Complete Physical     1. Have you been to the ER, urgent care clinic since your last visit? Hospitalized since your last visit? No    2. Have you seen or consulted any other health care providers outside of the [address] [place] [address] since your last visit? Include any pap smears or colon screening.    Yes Ortho for hand

## 2018-05-15 NOTE — PATIENT INSTRUCTIONS
Well Visit, Men 48 to 72: Care Instructions  Your Care Instructions    Physical exams can help you stay healthy. Your doctor has checked your overall health and may have suggested ways to take good care of yourself. He or she also may have recommended tests. At home, you can help prevent illness with healthy eating, regular exercise, and other steps. Follow-up care is a key part of your treatment and safety. Be sure to make and go to all appointments, and call your doctor if you are having problems. It's also a good idea to know your test results and keep a list of the medicines you take. How can you care for yourself at home? · Reach and stay at a healthy weight. This will lower your risk for many problems, such as obesity, diabetes, heart disease, and high blood pressure. · Get at least 30 minutes of exercise on most days of the week. Walking is a good choice. You also may want to do other activities, such as running, swimming, cycling, or playing tennis or team sports. · Do not smoke. Smoking can make health problems worse. If you need help quitting, talk to your doctor about stop-smoking programs and medicines. These can increase your chances of quitting for good. · Protect your skin from too much sun. When you're outdoors from 10 a.m. to 4 p.m., stay in the shade or cover up with clothing and a hat with a wide brim. Wear sunglasses that block UV rays. Even when it's cloudy, put broad-spectrum sunscreen (SPF 30 or higher) on any exposed skin. · See a dentist one or two times a year for checkups and to have your teeth cleaned. · Wear a seat belt in the car. · Limit alcohol to 2 drinks a day. Too much alcohol can cause health problems. Follow your doctor's advice about when to have certain tests. These tests can spot problems early. · Cholesterol.  Your doctor will tell you how often to have this done based on your overall health and other things that can increase your risk for heart attack and stroke. · Blood pressure. Have your blood pressure checked during a routine doctor visit. Your doctor will tell you how often to check your blood pressure based on your age, your blood pressure results, and other factors. · Prostate exam. Talk to your doctor about whether you should have a blood test (called a PSA test) for prostate cancer. Experts disagree on whether men should have this test. Some experts recommend that you discuss the benefits and risks of the test with your doctor. · Diabetes. Ask your doctor whether you should have tests for diabetes. · Vision. Some experts recommend that you have yearly exams for glaucoma and other age-related eye problems starting at age 48. · Hearing. Tell your doctor if you notice any change in your hearing. You can have tests to find out how well you hear. · Colon cancer. You should begin tests for colon cancer at age 48. You may have one of several tests. Your doctor will tell you how often to have tests based on your age and risk. Risks include whether you already had a precancerous polyp removed from your colon or whether your parent, brother, sister, or child has had colon cancer. · Heart attack and stroke risk. At least every 4 to 6 years, you should have your risk for heart attack and stroke assessed. Your doctor uses factors such as your age, blood pressure, cholesterol, and whether you smoke or have diabetes to show what your risk for a heart attack or stroke is over the next 10 years. · Abdominal aortic aneurysm. Ask your doctor whether you should have a test to check for an aneurysm. You may need a test if you ever smoked or if your parent, brother, sister, or child has had an aneurysm. When should you call for help? Watch closely for changes in your health, and be sure to contact your doctor if you have any problems or symptoms that concern you. Where can you learn more? Go to http://kae-patric.info/.   Enter Q494 in the search box to learn more about \"Well Visit, Men 48 to 72: Care Instructions. \"  Current as of: May 12, 2017  Content Version: 11.4  © 2087-9998 [employer]. Care instructions adapted under license by [employer] (which disclaims liability or warranty for this information). If you have questions about a medical condition or this instruction, always ask your healthcare professional. [address] any warranty or liability for your use of this information. Body Mass Index: Care Instructions  Your Care Instructions    Body mass index (BMI) can help you see if your weight is raising your risk for health problems. It uses a formula to compare how much you weigh with how tall you are. · A BMI lower than 18.5 is considered underweight. · A BMI between 18.5 and 24.9 is considered healthy. · A BMI between 25 and 29.9 is considered overweight. A BMI of 30 or higher is considered obese. If your BMI is in the normal range, it means that you have a lower risk for weight-related health problems. If your BMI is in the overweight or obese range, you may be at increased risk for weight-related health problems, such as high blood pressure, heart disease, stroke, arthritis or joint pain, and diabetes. If your BMI is in the underweight range, you may be at increased risk for health problems such as fatigue, lower protection (immunity) against illness, muscle loss, bone loss, hair loss, and hormone problems. BMI is just one measure of your risk for weight-related health problems. You may be at higher risk for health problems if you are not active, you eat an unhealthy diet, or you drink too much alcohol or use tobacco products. Follow-up care is a key part of your treatment and safety. Be sure to make and go to all appointments, and call your doctor if you are having problems. It's also a good idea to know your test results and keep a list of the medicines you take.   How can you care for yourself at home? · Practice healthy eating habits. This includes eating plenty of fruits, vegetables, whole grains, lean protein, and low-fat dairy. · If your doctor recommends it, get more exercise. Walking is a good choice. Bit by bit, increase the amount you walk every day. Try for at least 30 minutes on most days of the week. · Do not smoke. Smoking can increase your risk for health problems. If you need help quitting, talk to your doctor about stop-smoking programs and medicines. These can increase your chances of quitting for good. · Limit alcohol to 2 drinks a day for men and 1 drink a day for women. Too much alcohol can cause health problems. If you have a BMI higher than 25  · Your doctor may do other tests to check your risk for weight-related health problems. This may include measuring the distance around your waist. A waist measurement of more than 40 inches in men or 35 inches in women can increase the risk of weight-related health problems. · Talk with your doctor about steps you can take to stay healthy or improve your health. You may need to make lifestyle changes to lose weight and stay healthy, such as changing your diet and getting regular exercise. If you have a BMI lower than 18.5  · Your doctor may do other tests to check your risk for health problems. · Talk with your doctor about steps you can take to stay healthy or improve your health. You may need to make lifestyle changes to gain or maintain weight and stay healthy, such as getting more healthy foods in your diet and doing exercises to build muscle. Where can you learn more? Go to http://kae-patric.info/. Enter S176 in the search box to learn more about \"Body Mass Index: Care Instructions. \"  Current as of: October 13, 2016  Content Version: 11.4  © 6202-0218 [employer], [employer].  Care instructions adapted under license by [employer] (which disclaims liability or warranty for this information). If you have questions about a medical condition or this instruction, always ask your healthcare professional. [place] [address] any warranty or liability for your use of this information.

## 2018-05-16 ENCOUNTER — TELEPHONE (OUTPATIENT)
Dept: FAMILY MEDICINE CLINIC | Age: 53
End: 2018-05-16

## 2018-05-16 LAB
ALBUMIN SERPL-MCNC: 4.7 G/DL (ref 3.5–5.5)
ALBUMIN/GLOB SERPL: 2.1 {RATIO} (ref 1.2–2.2)
ALP SERPL-CCNC: 89 IU/L (ref 39–117)
ALT SERPL-CCNC: 21 IU/L (ref 0–44)
AST SERPL-CCNC: 27 IU/L (ref 0–40)
BASOPHILS # BLD AUTO: 0 X10E3/UL (ref 0–0.2)
BASOPHILS NFR BLD AUTO: 0 %
BILIRUB SERPL-MCNC: 0.8 MG/DL (ref 0–1.2)
BUN SERPL-MCNC: 18 MG/DL (ref 6–24)
BUN/CREAT SERPL: 15 (ref 9–20)
CALCIUM SERPL-MCNC: 9.5 MG/DL (ref 8.7–10.2)
CHLORIDE SERPL-SCNC: 100 MMOL/L (ref 96–106)
CHOLEST SERPL-MCNC: 177 MG/DL (ref 100–199)
CO2 SERPL-SCNC: 24 MMOL/L (ref 18–29)
CREAT SERPL-MCNC: 1.21 MG/DL (ref 0.76–1.27)
EOSINOPHIL # BLD AUTO: 0.2 X10E3/UL (ref 0–0.4)
EOSINOPHIL NFR BLD AUTO: 2 %
ERYTHROCYTE [DISTWIDTH] IN BLOOD BY AUTOMATED COUNT: 13.9 % (ref 12.3–15.4)
GFR SERPLBLD CREATININE-BSD FMLA CKD-EPI: 67 ML/MIN/1.73
GFR SERPLBLD CREATININE-BSD FMLA CKD-EPI: 78 ML/MIN/1.73
GLOBULIN SER CALC-MCNC: 2.2 G/DL (ref 1.5–4.5)
GLUCOSE SERPL-MCNC: 89 MG/DL (ref 65–99)
HCT VFR BLD AUTO: 42.3 % (ref 37.5–51)
HDLC SERPL-MCNC: 48 MG/DL
HGB BLD-MCNC: 13.7 G/DL (ref 13–17.7)
IMM GRANULOCYTES # BLD: 0 X10E3/UL (ref 0–0.1)
IMM GRANULOCYTES NFR BLD: 0 %
INTERPRETATION, 910389: NORMAL
LDLC SERPL CALC-MCNC: 108 MG/DL (ref 0–99)
LYMPHOCYTES # BLD AUTO: 2 X10E3/UL (ref 0.7–3.1)
LYMPHOCYTES NFR BLD AUTO: 23 %
MCH RBC QN AUTO: 28.2 PG (ref 26.6–33)
MCHC RBC AUTO-ENTMCNC: 32.4 G/DL (ref 31.5–35.7)
MCV RBC AUTO: 87 FL (ref 79–97)
MONOCYTES # BLD AUTO: 0.7 X10E3/UL (ref 0.1–0.9)
MONOCYTES NFR BLD AUTO: 8 %
NEUTROPHILS # BLD AUTO: 5.6 X10E3/UL (ref 1.4–7)
NEUTROPHILS NFR BLD AUTO: 67 %
PLATELET # BLD AUTO: 318 X10E3/UL (ref 150–379)
POTASSIUM SERPL-SCNC: 4.2 MMOL/L (ref 3.5–5.2)
PROT SERPL-MCNC: 6.9 G/DL (ref 6–8.5)
PSA SERPL-MCNC: 0.4 NG/ML (ref 0–4)
RBC # BLD AUTO: 4.86 X10E6/UL (ref 4.14–5.8)
REFLEX CRITERIA: NORMAL
SODIUM SERPL-SCNC: 140 MMOL/L (ref 134–144)
TESTOST FREE SERPL-MCNC: 7.2 PG/ML (ref 7.2–24)
TESTOST SERPL-MCNC: 809 NG/DL (ref 264–916)
TRIGL SERPL-MCNC: 105 MG/DL (ref 0–149)
TSH SERPL DL<=0.005 MIU/L-ACNC: 1.38 UIU/ML (ref 0.45–4.5)
URATE SERPL-MCNC: 5.8 MG/DL (ref 3.7–8.6)
VLDLC SERPL CALC-MCNC: 21 MG/DL (ref 5–40)
WBC # BLD AUTO: 8.5 X10E3/UL (ref 3.4–10.8)

## 2018-05-16 NOTE — TELEPHONE ENCOUNTER
CBC, liver, kidney, thyroid, uric acid all good and normal.  BS good and normal non diabetes range  Cholesterol numbers overall pretty good. PSA wnl and stable. Testosterone level good and normal range.   Advise pt we faxed over his health form to fax number provided  CPE 1 yr

## 2018-05-29 RX ORDER — FLUOXETINE HYDROCHLORIDE 20 MG/1
CAPSULE ORAL
Qty: 90 CAP | Refills: 3 | Status: SHIPPED | OUTPATIENT
Start: 2018-05-29 | End: 2019-07-22 | Stop reason: SDUPTHER

## 2018-07-23 RX ORDER — MONTELUKAST SODIUM 10 MG/1
TABLET ORAL
Qty: 90 TAB | Refills: 0 | Status: SHIPPED | OUTPATIENT
Start: 2018-07-23 | End: 2018-10-29 | Stop reason: SDUPTHER

## 2018-07-24 RX ORDER — OMEPRAZOLE 20 MG/1
CAPSULE, DELAYED RELEASE ORAL
Qty: 90 CAP | Refills: 3 | Status: SHIPPED | OUTPATIENT
Start: 2018-07-24 | End: 2021-08-02 | Stop reason: SDUPTHER

## 2018-09-10 DIAGNOSIS — J45.20 ALLERGIC ASTHMA, MILD INTERMITTENT, UNCOMPLICATED: ICD-10-CM

## 2018-09-11 RX ORDER — MOMETASONE FUROATE 220 UG/1
INHALANT RESPIRATORY (INHALATION)
Qty: 3 EACH | Refills: 2 | Status: SHIPPED | OUTPATIENT
Start: 2018-09-11 | End: 2019-10-03 | Stop reason: SDUPTHER

## 2018-09-11 RX ORDER — CELECOXIB 100 MG/1
CAPSULE ORAL
Qty: 180 CAP | Refills: 2 | Status: SHIPPED | OUTPATIENT
Start: 2018-09-11 | End: 2019-05-22

## 2018-10-29 RX ORDER — MONTELUKAST SODIUM 10 MG/1
TABLET ORAL
Qty: 90 TAB | Refills: 0 | Status: SHIPPED | OUTPATIENT
Start: 2018-10-29 | End: 2019-02-08 | Stop reason: SDUPTHER

## 2018-10-29 RX ORDER — ALLOPURINOL 100 MG/1
TABLET ORAL
Qty: 90 TAB | Refills: 2 | Status: SHIPPED | OUTPATIENT
Start: 2018-10-29 | End: 2020-01-22

## 2019-01-24 ENCOUNTER — TELEPHONE (OUTPATIENT)
Dept: FAMILY MEDICINE CLINIC | Age: 54
End: 2019-01-24

## 2019-01-24 NOTE — TELEPHONE ENCOUNTER
Fax from [employer]. clebrex not covered pt needs to try either etodolac caps, tabs or ER tabs. Ibuprofen, meloxicam or naproxen.

## 2019-01-24 NOTE — TELEPHONE ENCOUNTER
Please let pt know that we got a notice from [facility] that Celebrex is no longer going to be covered and that we need to try him on one of the alternatives they have listed. Has he tried/failed any of these before so I can determine which might be the best option for him? Also was he needing to take the Celebrex on a daily basis and if so once or twice a day?

## 2019-01-24 NOTE — TELEPHONE ENCOUNTER
BIANCA for return call. I called pt to let him know that insurance isn't covering the celebrex. Pt states that he thinks that he will do fine with just taking OTC advil. He will call us back if he changes his mind.

## 2019-02-08 RX ORDER — MONTELUKAST SODIUM 10 MG/1
TABLET ORAL
Qty: 90 TAB | Refills: 0 | Status: SHIPPED | OUTPATIENT
Start: 2019-02-08 | End: 2019-06-04 | Stop reason: SDUPTHER

## 2019-05-22 ENCOUNTER — OFFICE VISIT (OUTPATIENT)
Dept: FAMILY MEDICINE CLINIC | Age: 54
End: 2019-05-22

## 2019-05-22 VITALS
HEIGHT: 73 IN | BODY MASS INDEX: 26.37 KG/M2 | OXYGEN SATURATION: 98 % | WEIGHT: 199 LBS | DIASTOLIC BLOOD PRESSURE: 74 MMHG | TEMPERATURE: 97.8 F | SYSTOLIC BLOOD PRESSURE: 118 MMHG | RESPIRATION RATE: 18 BRPM | HEART RATE: 57 BPM

## 2019-05-22 DIAGNOSIS — J45.30 MILD PERSISTENT ASTHMA WITHOUT COMPLICATION: ICD-10-CM

## 2019-05-22 DIAGNOSIS — Z12.5 SCREENING PSA (PROSTATE SPECIFIC ANTIGEN): ICD-10-CM

## 2019-05-22 DIAGNOSIS — Z23 ENCOUNTER FOR IMMUNIZATION: ICD-10-CM

## 2019-05-22 DIAGNOSIS — E79.0 HYPERURICEMIA: ICD-10-CM

## 2019-05-22 DIAGNOSIS — Z00.00 ROUTINE GENERAL MEDICAL EXAMINATION AT A HEALTH CARE FACILITY: Primary | ICD-10-CM

## 2019-05-22 PROBLEM — K22.2 GERD WITH STRICTURE: Status: ACTIVE | Noted: 2019-05-22

## 2019-05-22 PROBLEM — K21.9 GERD WITH STRICTURE: Status: ACTIVE | Noted: 2019-05-22

## 2019-05-22 NOTE — PROGRESS NOTES
Chief Complaint   Patient presents with    Complete Physical     Fasting, health form for work     HISTORY OF PRESENT ILLNESS   HPI  Annual CPE fasting. Brought in form for labs to be completed and faxed for work. Overall has been getting along well and feeling well in general.  Sensible diet and exercises 3-5 x a week. Wt stable over time. Doing well on current medication regimen. REVIEW OF SYMPTOMS   Review of Systems   Constitutional: Negative. HENT: Negative. Eyes: Negative. Respiratory: Negative. Negative for cough, shortness of breath and wheezing. Asthma stable x years on maintenance Singulair and Asmanex once daily. He has not needed a rescue inhaler for many years. Cardiovascular: Negative. Negative for chest pain, palpitations and leg swelling. Gastrointestinal: Negative. Negative for abdominal pain, blood in stool, constipation, diarrhea, melena, nausea and vomiting. Takes Prilosec daily. In past when trying to wean off, he gets back gerd/burning/chest pain and increased swallowing difficulty. Symptoms remain stable on Prilosec   Genitourinary: Negative. Denies weak urine stream or nocturia. Musculoskeletal:        His insurance would no longer cover Celebrex so he has been off it x months and doing ok w/o it. He has not even needed to take anything otc. Had thumb surgery , PT/OT has helped. Getting a lot better. Back in the gym regularly since 1-2019. Relatively pain free. No gout flares. Doing well on Allopurinol. Neurological: Negative. Endo/Heme/Allergies: Negative. Negative for environmental allergies (well controlled on Allegra daily, Singulair daily and prn Flonase a few x a week). Psychiatric/Behavioral: Negative.             PROBLEM LIST/MEDICAL HISTORY     Problem List  Date Reviewed: 5/22/2019          Codes Class Noted    GERD with stricture ICD-10-CM: K21.9, K22.2  ICD-9-CM: 530.81, 530.3  5/22/2019    Overview Signed 5/22/2019 11:23 AM by Paulie Rojo MD     EGD, esophageal dilatation ~2012             ISMAEL (obstructive sleep apnea) ICD-10-CM: G47.33  ICD-9-CM: 327.23  5/15/2018    Overview Signed 5/15/2018 12:09 PM by Paulie Rojo MD     ~4811; Intolerant of CPAP             Hyperuricemia ICD-10-CM: E79.0  ICD-9-CM: 790.6  5/15/2018        Mild persistent asthma without complication [facility]-BE: D84.70  ICD-9-CM: 493.90  5/15/2018        Chronic low back pain with left-sided sciatica ICD-10-CM: M54.42, G89.29  ICD-9-CM: 724.2, 724.3, 338.29  5/15/2018    Overview Signed 5/15/2018  1:54 PM by Paulie Rojo MD     MRI 2016: Mild multilevel disc and facet degenerative change focused at L5-S1. Moderate broad-based protrusion at L5-S1 with mild canal stenosis and mild   effacement of nerve is extending to both the right and left S1 foramina.                Protrusion of lumbar intervertebral disc ICD-10-CM: M51.26  ICD-9-CM: 722.10  5/15/2018    Overview Signed 5/15/2018  1:54 PM by Paulie Rojo MD     MRI 2016: Mild multilevel disc and facet degenerative change focused at L5-S1. Moderate broad-based protrusion at L5-S1 with mild canal stenosis and mild   effacement of nerve is extending to both the right and left S1 foramina.              DDD (degenerative disc disease), lumbar ICD-10-CM: M51.36  ICD-9-CM: 722.52  5/15/2018        Lumbar radiculitis ICD-10-CM: M54.16  ICD-9-CM: 724.4  6/3/2016        S/P epidural steroid injection ICD-10-CM: Z92.241  ICD-9-CM: V45.89  6/3/2016    Overview Signed 6/3/2016 12:05 PM by Mara Hercules     Left foraminal with Dr. Elyssa Shaikh on 6/1/16             Allergic asthma ICD-10-CM: J45.909  ICD-9-CM: 493.00  2/10/2010        Peptic ulcer disease ICD-10-CM: K27.9  ICD-9-CM: 533.90  2/10/2010        Depression ICD-10-CM: F32.9  ICD-9-CM: 416  2/10/2010        Gout ICD-10-CM: M10.9  ICD-9-CM: 274.9  2/10/2010                  PAST SURGICAL HISTORY     Past Surgical History:   Procedure Laterality Date    HX COLONOSCOPY  12/22/2014    Normal, follow up 10 yrs, Dr Ama Calixto HX ENDOSCOPY  2012    esophageal dilatation     HX ORTHOPAEDIC Left 11/12/2018    Left Thumb/CMC Joint repair, Dr Mery Alfred, [facility]    HX VASECTOMY  12/2013         MEDICATIONS     Current Outpatient Medications   Medication Sig    fexofenadine HCl (ALLEGRA PO) Take 180 mg by mouth daily.  calcium carbonate/vitamin D3 (CALCIUM+D PO) Take  by mouth daily.  ferrous sulfate (IRON PO) Take  by mouth daily. Self started 1-2019    montelukast (SINGULAIR) 10 mg tablet TAKE 1 TABLET DAILY    allopurinol (ZYLOPRIM) 100 mg tablet TAKE 1 TABLET DAILY    ASMANEX TWISTHALER 220 mcg (30 doses) inhaler USE 1 INHALATION DAILY (DISCARD 45 DAYS AFTER OPENING)    omeprazole (PRILOSEC) 20 mg capsule TAKE 1 CAPSULE DAILY    FLUoxetine (PROZAC) 20 mg capsule TAKE 1 CAPSULE DAILY    multivitamin (ONE A DAY) tablet Take 1 Tab by mouth daily.  fluticasone (FLONASE) 50 mcg/actuation nasal spray USE 2 SPRAYS IN EACH NOSTRIL DAILY     No current facility-administered medications for this visit. ALLERGIES   No Known Allergies       SOCIAL HISTORY     Social History     Socioeconomic History    Marital status:      Spouse name: Not on file    Number of children: Not on file    Years of education: Not on file    Highest education level: Not on file   Occupational History    Occupation:  for [employer]    Smoking status: Never Smoker    Smokeless tobacco: Never Used   Substance and Sexual Activity    Alcohol use:  Yes     Alcohol/week: 0.5 oz     Types: 1 Glasses of wine per week     Comment: maybe 1 glass of wine/beer a few times a week    Drug use: No    Sexual activity: Yes     Partners: Female     Birth control/protection: Surgical   Other Topics Concern    Caffeine Concern No     Comment: ice tea 2 x a day, diek Coke once a day    Weight Concern No     Comment: stable over the years    Special Diet No    Back Care Yes     Comment: 2016-1/2017: Dr Gaurav Regan, [facility], PT     Exercise Yes     Comment: restarted 1-2019: cardio 3-5 x a week elliptical x 20 minutes, stationary bike x 40 minutes, 4 days a week does wts x 1 hr   Social History Narrative    Wife Amy Rodriguez is a PAFP of Dr Byron Castle  Immunization History   Administered Date(s) Administered    TDAP Vaccine 01/04/2011         FAMILY HISTORY     Family History   Problem Relation Age of Onset    Diabetes Father     Cancer Mother 80        bladder cancer removed 1-2018    Breast Cancer Mother         mastectomy 2012    No Known Problems Sister     No Known Problems Brother     Stroke Maternal Aunt          VITALS     Visit Vitals  /74 (BP 1 Location: Right arm, BP Patient Position: Sitting)   Pulse (!) 57   Temp 97.8 °F (36.6 °C) (Oral)   Resp 18   Ht 6' 1\" (1.854 m)   Wt 199 lb (90.3 kg)   SpO2 98%   BMI 26.25 kg/m²          PHYSICAL EXAMINATION   Physical Exam   Constitutional: He is oriented to person, place, and time and well-developed, well-nourished, and in no distress. HENT:   Right Ear: Tympanic membrane normal.   Left Ear: Tympanic membrane normal.   Nose: Nose normal.   Mouth/Throat: Oropharynx is clear and moist. No oropharyngeal exudate. Eyes: Pupils are equal, round, and reactive to light. Conjunctivae and EOM are normal.   Neck: Neck supple. Carotid bruit is not present. No thyromegaly present. Cardiovascular: Normal rate, regular rhythm and normal heart sounds. No murmur heard. Pulmonary/Chest: Effort normal and breath sounds normal. No respiratory distress. He has no wheezes. Abdominal: Soft. Bowel sounds are normal. He exhibits no distension and no mass. There is no tenderness. Waist measurement 38.5\"   Genitourinary:   Genitourinary Comments: Deferred    Musculoskeletal: Normal range of motion. He exhibits no edema or tenderness.    Lymphadenopathy:     He has no cervical adenopathy. Neurological: He is alert and oriented to person, place, and time. Gait normal.   Skin: Skin is warm and dry. Psychiatric: Mood and affect normal.   Vitals reviewed. LABORATORY DATA     Results for orders placed or performed in visit on 05/15/18   CBC WITH AUTOMATED DIFF   Result Value Ref Range    WBC 8.5 3.4 - 10.8 x10E3/uL    RBC 4.86 4.14 - 5.80 x10E6/uL    HGB 13.7 13.0 - 17.7 g/dL    HCT 42.3 37.5 - 51.0 %    MCV 87 79 - 97 fL    MCH 28.2 26.6 - 33.0 pg    MCHC 32.4 31.5 - 35.7 g/dL    RDW 13.9 12.3 - 15.4 %    PLATELET 740 571 - 892 x10E3/uL    NEUTROPHILS 67 Not Estab. %    Lymphocytes 23 Not Estab. %    MONOCYTES 8 Not Estab. %    EOSINOPHILS 2 Not Estab. %    BASOPHILS 0 Not Estab. %    ABS. NEUTROPHILS 5.6 1.4 - 7.0 x10E3/uL    Abs Lymphocytes 2.0 0.7 - 3.1 x10E3/uL    ABS. MONOCYTES 0.7 0.1 - 0.9 x10E3/uL    ABS. EOSINOPHILS 0.2 0.0 - 0.4 x10E3/uL    ABS. BASOPHILS 0.0 0.0 - 0.2 x10E3/uL    IMMATURE GRANULOCYTES 0 Not Estab. %    ABS. IMM. GRANS. 0.0 0.0 - 0.1 x10E3/uL   LIPID PANEL   Result Value Ref Range    Cholesterol, total 177 100 - 199 mg/dL    Triglyceride 105 0 - 149 mg/dL    HDL Cholesterol 48 >39 mg/dL    VLDL, calculated 21 5 - 40 mg/dL    LDL, calculated 108 (H) 0 - 99 mg/dL   METABOLIC PANEL, COMPREHENSIVE   Result Value Ref Range    Glucose 89 65 - 99 mg/dL    BUN 18 6 - 24 mg/dL    Creatinine 1.21 0.76 - 1.27 mg/dL    GFR est non-AA 67 >59 mL/min/1.73    GFR est AA 78 >59 mL/min/1.73    BUN/Creatinine ratio 15 9 - 20    Sodium 140 134 - 144 mmol/L    Potassium 4.2 3.5 - 5.2 mmol/L    Chloride 100 96 - 106 mmol/L    CO2 24 18 - 29 mmol/L    Calcium 9.5 8.7 - 10.2 mg/dL    Protein, total 6.9 6.0 - 8.5 g/dL    Albumin 4.7 3.5 - 5.5 g/dL    GLOBULIN, TOTAL 2.2 1.5 - 4.5 g/dL    A-G Ratio 2.1 1.2 - 2.2    Bilirubin, total 0.8 0.0 - 1.2 mg/dL    Alk.  phosphatase 89 39 - 117 IU/L    AST (SGOT) 27 0 - 40 IU/L    ALT (SGPT) 21 0 - 44 IU/L   TSH 3RD GENERATION Result Value Ref Range    TSH 1.380 0.450 - 4.500 uIU/mL   URIC ACID   Result Value Ref Range    Uric acid 5.8 3.7 - 8.6 mg/dL   PSA W/ REFLX FREE PSA   Result Value Ref Range    Prostate Specific Ag 0.4 0.0 - 4.0 ng/mL    Reflex Criteria Comment    TESTOSTERONE, FREE & TOTAL   Result Value Ref Range    Testosterone 809 264 - 916 ng/dL    Free testosterone (Direct) 7.2 7.2 - 24.0 pg/mL   CVD REPORT   Result Value Ref Range    INTERPRETATION Note           ASSESSMENT & PLAN       ICD-10-CM ICD-9-CM    1. Routine general medical examination at a health care facility Z00.00 V70.0 CBC W/O DIFF      LIPID PANEL      METABOLIC PANEL, COMPREHENSIVE      TSH 3RD GENERATION      URIC ACID      VITAMIN D, 25 HYDROXY   2. Hyperuricemia E79.0 790.6 URIC ACID   3. Mild persistent asthma without complication, controlled, stable J45.30 493.90    4. Screening PSA (prostate specific antigen) Z12.5 V76.44 PSA W/ REFLX FREE PSA   5. Encounter for immunization Z23 V03.89 PNEUMOCOCCAL POLYSACCHARIDE VACCINE, 23-VALENT, ADULT OR IMMUNOSUPPRESSED PT DOSE,      [place] IMMUNIZ ADMIN,1 SINGLE/COMB VAC/TOXOID     Fasting labs today  Will complete health form when labs available and fax signed copy to number provided  Cardiovascular risk and specific lipid/LDL goals reviewed  Reviewed diet, nutrition, exercise, weight management, BMI/goals, age/risk based screening recommendations, health maintenance & prevention counseling. Cancer screening USPTFS guidelines reviewed w/ pt today. Discussed benefits/positive/negative outcomes of screening based on age/risk stratification. Informed consent for/against screening based on pt's personal hx/risk factors. Updated in history above and health maintenance. Reviewed medications, effects, risks, benefits, potential short/long term effects and side effects  Doing well on current regimen  Further follow up & other recommendations pending review of labs.  If all remains good and stable, follow up in 1 yr, sooner prn

## 2019-05-22 NOTE — PROGRESS NOTES
Chief Complaint   Patient presents with    Complete Physical     fasting       1. Have you been to the ER, urgent care clinic since your last visit? Hospitalized since your last visit? No    2. Have you seen or consulted any other health care providers outside of the [address] [place] [address] since your last visit? Include any pap smears or colon screening.  Patient had surgery on left hand in November at [facility]

## 2019-05-22 NOTE — PATIENT INSTRUCTIONS
Well Visit, Men 48 to 72: Care Instructions  Your Care Instructions    Physical exams can help you stay healthy. Your doctor has checked your overall health and may have suggested ways to take good care of yourself. He or she also may have recommended tests. At home, you can help prevent illness with healthy eating, regular exercise, and other steps. Follow-up care is a key part of your treatment and safety. Be sure to make and go to all appointments, and call your doctor if you are having problems. It's also a good idea to know your test results and keep a list of the medicines you take. How can you care for yourself at home? · Reach and stay at a healthy weight. This will lower your risk for many problems, such as obesity, diabetes, heart disease, and high blood pressure. · Get at least 30 minutes of exercise on most days of the week. Walking is a good choice. You also may want to do other activities, such as running, swimming, cycling, or playing tennis or team sports. · Do not smoke. Smoking can make health problems worse. If you need help quitting, talk to your doctor about stop-smoking programs and medicines. These can increase your chances of quitting for good. · Protect your skin from too much sun. When you're outdoors from 10 a.m. to 4 p.m., stay in the shade or cover up with clothing and a hat with a wide brim. Wear sunglasses that block UV rays. Even when it's cloudy, put broad-spectrum sunscreen (SPF 30 or higher) on any exposed skin. · See a dentist one or two times a year for checkups and to have your teeth cleaned. · Wear a seat belt in the car. · Limit alcohol to 2 drinks a day. Too much alcohol can cause health problems. Follow your doctor's advice about when to have certain tests. These tests can spot problems early. · Cholesterol.  Your doctor will tell you how often to have this done based on your overall health and other things that can increase your risk for heart attack and stroke. · Blood pressure. Have your blood pressure checked during a routine doctor visit. Your doctor will tell you how often to check your blood pressure based on your age, your blood pressure results, and other factors. · Prostate exam. Talk to your doctor about whether you should have a blood test (called a PSA test) for prostate cancer. Experts disagree on whether men should have this test. Some experts recommend that you discuss the benefits and risks of the test with your doctor. · Diabetes. Ask your doctor whether you should have tests for diabetes. · Vision. Some experts recommend that you have yearly exams for glaucoma and other age-related eye problems starting at age 48. · Hearing. Tell your doctor if you notice any change in your hearing. You can have tests to find out how well you hear. · Colon cancer. You should begin tests for colon cancer at age 48. You may have one of several tests. Your doctor will tell you how often to have tests based on your age and risk. Risks include whether you already had a precancerous polyp removed from your colon or whether your parent, brother, sister, or child has had colon cancer. · Heart attack and stroke risk. At least every 4 to 6 years, you should have your risk for heart attack and stroke assessed. Your doctor uses factors such as your age, blood pressure, cholesterol, and whether you smoke or have diabetes to show what your risk for a heart attack or stroke is over the next 10 years. · Abdominal aortic aneurysm. Ask your doctor whether you should have a test to check for an aneurysm. You may need a test if you ever smoked or if your parent, brother, sister, or child has had an aneurysm. When should you call for help? Watch closely for changes in your health, and be sure to contact your doctor if you have any problems or symptoms that concern you. Where can you learn more? Go to http://kae-patric.info/.   Enter J245 in the search box to learn more about \"Well Visit, Men 48 to 72: Care Instructions. \"  Current as of: March 28, 2018  Content Version: 11.9  © 3362-7859 [employer]. Care instructions adapted under license by [employer] (which disclaims liability or warranty for this information). If you have questions about a medical condition or this instruction, always ask your healthcare professional. [address] any warranty or liability for your use of this information. Pneumococcal Polysaccharide Vaccine: What You Need to Know  Why get vaccinated? Vaccination can protect older adults (and some children and younger adults) from pneumococcal disease. Pneumococcal disease is caused by bacteria that can spread from person to person through close contact. It can cause ear infections, and it can also lead to more serious infections of the:  · Lungs (pneumonia),  · Blood (bacteremia), and  · Covering of the brain and spinal cord (meningitis). Meningitis can cause deafness and brain damage, and it can be fatal.  Anyone can get pneumococcal disease, but children under 3years of age, people with certain medical conditions, adults over 72years of age, and cigarette smokers are at the highest risk. About 18,000 older adults die each year from pneumococcal disease in the [place]. Treatment of pneumococcal infections with penicillin and other drugs used to be more effective. But some strains of the disease have become resistant to these drugs. This makes prevention of the disease, through vaccination, even more important. Pneumococcal polysaccharide vaccine (PPSV23)  Pneumococcal polysaccharide vaccine (PPSV23) protects against 23 types of pneumococcal bacteria. It will not prevent all pneumococcal disease.   PPSV23 is recommended for:  · All adults 72years of age and older,  · Anyone 2 through 59years of age with certain long-term health problems,  · Anyone 2 through 59years of age with a weakened immune system,  · Adults 19 through 59years of age who smoke cigarettes or have asthma. Most people need only one dose of PPSV. A second dose is recommended for certain high-risk groups. People 72 and older should get a dose even if they have gotten one or more doses of the vaccine before they turned 65. Your healthcare provider can give you more information about these recommendations. Most healthy adults develop protection within 2 to 3 weeks of getting the shot. Some people should not get this vaccine  · Anyone who has had a life-threatening allergic reaction to PPSV should not get another dose. · Anyone who has a severe allergy to any component of PPSV should not receive it. Tell your provider if you have any severe allergies. · Anyone who is moderately or severely ill when the shot is scheduled may be asked to wait until they recover before getting the vaccine. Someone with a mild illness can usually be vaccinated. · Children less than 3years of age should not receive this vaccine. · There is no evidence that PPSV is harmful to either a pregnant woman or to her fetus. However, as a precaution, women who need the vaccine should be vaccinated before becoming pregnant, if possible. Risks of a vaccine reaction  With any medicine, including vaccines, there is a chance of side effects. These are usually mild and go away on their own, but serious reactions are also possible. About half of people who get PPSV have mild side effects, such as redness or pain where the shot is given, which go away within about two days. Less than 1 out of 100 people develop a fever, muscle aches, or more severe local reactions. Problems that could happen after any vaccine:  · People sometimes faint after a medical procedure, including vaccination. Sitting or lying down for about 15 minutes can help prevent fainting, and injuries caused by a fall.  Tell your doctor if you feel dizzy, or have vision changes or ringing in the ears. · Some people get severe pain in the shoulder and have difficulty moving the arm where a shot was given. This happens very rarely. · Any medication can cause a severe allergic reaction. Such reactions from a vaccine are very rare, estimated at about 1 in a million doses, and would happen within a few minutes to a few hours after the vaccination. As with any medicine, there is a very remote chance of a vaccine causing a serious injury or death. The safety of vaccines is always being monitored. For more information, visit: www.cdc.gov/vaccinesafety/  What if there is a serious reaction? What should I look for? Look for anything that concerns you, such as signs of a severe allergic reaction, very high fever, or unusual behavior. Signs of a severe allergic reaction can include hives, swelling of the face and throat, difficulty breathing, a fast heartbeat, dizziness, and weakness. These would usually start a few minutes to a few hours after the vaccination. What should I do? If you think it is a severe allergic reaction or other emergency that can't wait, call 9-1-1 or get to the nearest hospital. Otherwise, call your doctor. Afterward, the reaction should be reported to the Vaccine Adverse Event Reporting System (VAERS). Your doctor might file this report, or you can do it yourself through the VAERS web site at www.vaers. hhs.gov, or by calling 8-101.767.2502. VAERS does not give medical advice. How can I learn more? · Ask your doctor. He or she can give you the vaccine package insert or suggest other sources of information. · Call your local or state health department. · Contact the Centers for Disease Control and Prevention (CDC):  ? Call 8-459.868.1501 (1-800-CDC-INFO) or  ?  Visit CDC's website at www.cdc.gov/vaccines  Vaccine Information Statement  PPSV Vaccine  (04/24/2015)  Department of Health and Human Services  Centers for Disease Control and Prevention  Many Vaccine Information Statements are available in Chinese and other languages. See www.immunize.org/vis. Hojas de información Sobre Vacunas están disponibles en español y en muchos otros idiomas. Visite Nasim.si. Care instructions adapted under license by [employer] (which disclaims liability or warranty for this information). If you have questions about a medical condition or this instruction, always ask your healthcare professional. [address] any warranty or liability for your use of this information.

## 2019-05-23 LAB
25(OH)D3+25(OH)D2 SERPL-MCNC: 33.3 NG/ML (ref 30–100)
ALBUMIN SERPL-MCNC: 4.6 G/DL (ref 3.5–5.5)
ALBUMIN/GLOB SERPL: 2.1 {RATIO} (ref 1.2–2.2)
ALP SERPL-CCNC: 92 IU/L (ref 39–117)
ALT SERPL-CCNC: 20 IU/L (ref 0–44)
AST SERPL-CCNC: 25 IU/L (ref 0–40)
BILIRUB SERPL-MCNC: 0.9 MG/DL (ref 0–1.2)
BUN SERPL-MCNC: 16 MG/DL (ref 6–24)
BUN/CREAT SERPL: 15 (ref 9–20)
CALCIUM SERPL-MCNC: 9.6 MG/DL (ref 8.7–10.2)
CHLORIDE SERPL-SCNC: 102 MMOL/L (ref 96–106)
CHOLEST SERPL-MCNC: 181 MG/DL (ref 100–199)
CO2 SERPL-SCNC: 22 MMOL/L (ref 20–29)
CREAT SERPL-MCNC: 1.1 MG/DL (ref 0.76–1.27)
ERYTHROCYTE [DISTWIDTH] IN BLOOD BY AUTOMATED COUNT: 14 % (ref 12.3–15.4)
GLOBULIN SER CALC-MCNC: 2.2 G/DL (ref 1.5–4.5)
GLUCOSE SERPL-MCNC: 83 MG/DL (ref 65–99)
HCT VFR BLD AUTO: 40.6 % (ref 37.5–51)
HDLC SERPL-MCNC: 49 MG/DL
HGB BLD-MCNC: 13.6 G/DL (ref 13–17.7)
INTERPRETATION, 910389: NORMAL
LDLC SERPL CALC-MCNC: 111 MG/DL (ref 0–99)
MCH RBC QN AUTO: 28.3 PG (ref 26.6–33)
MCHC RBC AUTO-ENTMCNC: 33.5 G/DL (ref 31.5–35.7)
MCV RBC AUTO: 85 FL (ref 79–97)
PLATELET # BLD AUTO: 298 X10E3/UL (ref 150–450)
POTASSIUM SERPL-SCNC: 4.2 MMOL/L (ref 3.5–5.2)
PROT SERPL-MCNC: 6.8 G/DL (ref 6–8.5)
PSA SERPL-MCNC: 0.4 NG/ML (ref 0–4)
RBC # BLD AUTO: 4.8 X10E6/UL (ref 4.14–5.8)
REFLEX CRITERIA: NORMAL
SODIUM SERPL-SCNC: 140 MMOL/L (ref 134–144)
TRIGL SERPL-MCNC: 103 MG/DL (ref 0–149)
TSH SERPL DL<=0.005 MIU/L-ACNC: 1.12 UIU/ML (ref 0.45–4.5)
URATE SERPL-MCNC: 7.5 MG/DL (ref 3.7–8.6)
VLDLC SERPL CALC-MCNC: 21 MG/DL (ref 5–40)
WBC # BLD AUTO: 7.8 X10E3/UL (ref 3.4–10.8)

## 2019-06-04 RX ORDER — MONTELUKAST SODIUM 10 MG/1
TABLET ORAL
Qty: 90 TAB | Refills: 0 | Status: SHIPPED | OUTPATIENT
Start: 2019-06-04 | End: 2019-09-20 | Stop reason: SDUPTHER

## 2019-06-15 ENCOUNTER — TELEPHONE (OUTPATIENT)
Dept: FAMILY MEDICINE CLINIC | Age: 54
End: 2019-06-15

## 2019-06-16 NOTE — TELEPHONE ENCOUNTER
Regarding: FW: Non-Urgent Medical Question  Contact: [phone]  Let me know what to tell him  ----- Message -----  From: Huy Longoria  Sent: 6/14/2019   2:29 PM  To: [facility] Nurse Pool  Subject: Non-Urgent Medical Question                      ----- Message from [address] [place] [address] 951, Generic sent at 6/14/2019  2:29 PM EDT -----    Same question from last time. I made an appointment last night, but cancelled because the bumps were not showing very well. They are inflamed in the morning when I wake up. They were not as bad this morning but I tried to take some pictures.   Let me know.      ------6/13/19 7:12 AM  For the past couple/few weeks I have been waking up with itching small clusters of bumps/welts (looks like group of mosquito bites) on my elbows, knees and the backs of my thighs.  At first I thought they were mosquito bites, but I know they are not now. [facility] does not have any at all.  I have been putting cortazone, anti biotic crème to help with the itching.  It seems to settle down during the day, but when I wake up it is back and irritated again.  I have not changed anything in my daily life to think it could be an allergic reaction to something new.  The only new thing was the pneumococcal (spelling?) shot that I received a few weeks ago. Kade Mealing advise. Lilliana Chung

## 2019-06-16 NOTE — TELEPHONE ENCOUNTER
I cant see the pictures very clearly, they are blurred. Best to see in person. I do not think it is related to the pneumonia shot he got however, but would need to see him for better assessment.   If not cleared up by the time you talk to him, you can book him w/ me

## 2019-06-17 NOTE — TELEPHONE ENCOUNTER
I called pt. He said that he didn't have any spots today. He is scheduled for tomorrow am.  He said that the takes allegra most days(mostly takes it M-F tends to forget on the weekend). He took an extra benadryl yesterday. He said that it started ~ memorial day when he was out of town. He knows when it is going to flare. It starts of feeling itchy then he will get a red spot that will turn into what looks like a mosquito bite. He will cancel appt tomorrow if he doesn't have a flare.

## 2019-06-18 ENCOUNTER — OFFICE VISIT (OUTPATIENT)
Dept: FAMILY MEDICINE CLINIC | Age: 54
End: 2019-06-18

## 2019-06-18 VITALS
BODY MASS INDEX: 26.8 KG/M2 | HEIGHT: 73 IN | OXYGEN SATURATION: 97 % | TEMPERATURE: 97.9 F | WEIGHT: 202.25 LBS | HEART RATE: 60 BPM | DIASTOLIC BLOOD PRESSURE: 82 MMHG | RESPIRATION RATE: 18 BRPM | SYSTOLIC BLOOD PRESSURE: 122 MMHG

## 2019-06-18 DIAGNOSIS — L50.9 URTICARIA: Primary | ICD-10-CM

## 2019-06-18 NOTE — PROGRESS NOTES
Chief Complaint   Patient presents with    Itching     all over body, welts popping up at times all over, tried otc cortisone creams       HISTORY OF PRESENT ILLNESS   HPI  Patient started ~ 5-25-19 w/ hives popping up various places on his body while staying at his brother's house visiting in [place]. Areas come and go involving his arms, elbows, dorsal hands, behind knees, thighs, ankles and back. Face, palms and soles are unaffected. They look like red blotches that welt up and when he scratches it leaves red streaks on his skin. He has applied cortizone cream which helps some temporarily and antibiotic cream which doesn't seem to do anything. He takes Allegra daily year round anyway for his environmental allergies. One night he took a Benadryl which helped suppress it so he could rest.  Aside from staying at his brothers and getting the PPSV 23 on 5-22-19, he has not had anything new or different. No change in his own soaps, lotions, detergents or other household products. No new meds aside from the shot. No inciting foods that he can specify. No other family members affected. Aside from this he feels perfectly well. REVIEW OF SYMPTOMS   Review of Systems   Constitutional: Negative for chills and fever. HENT: Negative. Eyes: Negative. Respiratory: Negative. PROBLEM LIST/MEDICAL HISTORY     Problem List  Date Reviewed: 6/18/2019          Codes Class Noted    GERD with stricture ICD-10-CM: K21.9, K22.2  ICD-9-CM: 530.81, 530.3  5/22/2019    Overview Signed 5/22/2019 11:23 AM by Debra Valladares MD     EGD, esophageal dilatation ~2012             ISMAEL (obstructive sleep apnea) ICD-10-CM: C44.52  ICD-9-CM: 327.23  5/15/2018    Overview Signed 5/15/2018 12:09 PM by Debra Valladares MD     ~4672;  Intolerant of CPAP             Hyperuricemia ICD-10-CM: E79.0  ICD-9-CM: 790.6  5/15/2018        Mild persistent asthma without complication NGN-[id number]: A14.16  ICD-9-CM: 493.90  5/15/2018        Chronic low back pain with left-sided sciatica ICD-10-CM: M54.42, G89.29  ICD-9-CM: 724.2, 724.3, 338.29  5/15/2018    Overview Signed 5/15/2018  1:54 PM by Laurent Lucero MD     MRI 2016: Mild multilevel disc and facet degenerative change focused at L5-S1. Moderate broad-based protrusion at L5-S1 with mild canal stenosis and mild   effacement of nerve is extending to both the right and left S1 foramina.                Protrusion of lumbar intervertebral disc ICD-10-CM: M51.26  ICD-9-CM: 722.10  5/15/2018    Overview Signed 5/15/2018  1:54 PM by Laurent Lucero MD     MRI 2016: Mild multilevel disc and facet degenerative change focused at L5-S1. Moderate broad-based protrusion at L5-S1 with mild canal stenosis and mild   effacement of nerve is extending to both the right and left S1 foramina.              DDD (degenerative disc disease), lumbar ICD-10-CM: M51.36  ICD-9-CM: 722.52  5/15/2018        Lumbar radiculitis ICD-10-CM: M54.16  ICD-9-CM: 724.4  6/3/2016        S/P epidural steroid injection ICD-10-CM: Z92.241  ICD-9-CM: V45.89  6/3/2016    Overview Signed 6/3/2016 12:05 PM by Kenia Arnett     Left foraminal with Dr. Mally Rossi on 6/1/16             Allergic asthma ICD-10-CM: J45.909  ICD-9-CM: 493.00  2/10/2010        Peptic ulcer disease ICD-10-CM: K27.9  ICD-9-CM: 533.90  2/10/2010        Depression ICD-10-CM: F32.9  ICD-9-CM: 521  2/10/2010        Gout ICD-10-CM: M10.9  ICD-9-CM: 274.9  2/10/2010                  PAST SURGICAL HISTORY     Past Surgical History:   Procedure Laterality Date    HX COLONOSCOPY  12/22/2014    Normal, follow up 10 yrs, Dr Ilda Gillis HX ENDOSCOPY  2012    esophageal dilatation     HX ORTHOPAEDIC Left 11/12/2018    Left Thumb/CMC Joint repair, Dr Sue Currie, Johann Handley    HX VASECTOMY  12/2013         MEDICATIONS     Current Outpatient Medications   Medication Sig    montelukast (SINGULAIR) 10 mg tablet TAKE 1 TABLET DAILY    fexofenadine HCl (ALLEGRA PO) Take 180 mg by mouth daily.  calcium carbonate/vitamin D3 (CALCIUM+D PO) Take  by mouth daily.  ferrous sulfate (IRON PO) Take  by mouth daily. Self started 1-2019    allopurinol (ZYLOPRIM) 100 mg tablet TAKE 1 TABLET DAILY    ASMANEX TWISTHALER 220 mcg (30 doses) inhaler USE 1 INHALATION DAILY (DISCARD 45 DAYS AFTER OPENING)    omeprazole (PRILOSEC) 20 mg capsule TAKE 1 CAPSULE DAILY    FLUoxetine (PROZAC) 20 mg capsule TAKE 1 CAPSULE DAILY    multivitamin (ONE A DAY) tablet Take 1 Tab by mouth daily.  fluticasone (FLONASE) 50 mcg/actuation nasal spray USE 2 SPRAYS IN EACH NOSTRIL DAILY     No current facility-administered medications for this visit. ALLERGIES   No Known Allergies       SOCIAL HISTORY     Social History     Socioeconomic History    Marital status:      Spouse name: Not on file    Number of children: Not on file    Years of education: Not on file    Highest education level: Not on file   Occupational History    Occupation:  for [employer] Use    Smoking status: Never Smoker    Smokeless tobacco: Never Used   Substance and Sexual Activity    Alcohol use:  Yes     Alcohol/week: 0.5 oz     Types: 1 Glasses of wine per week     Comment: maybe 1 glass of wine/beer a few times a week    Drug use: No    Sexual activity: Yes     Partners: Female     Birth control/protection: Surgical   Other Topics Concern    Caffeine Concern No     Comment: ice tea 2 x a day, diek Coke once a day    Weight Concern No     Comment: stable over the years    Special Diet No    Back Care Yes     Comment: 2016-1/2017: Dr Jose Gauthier, [facility], PT     Exercise Yes     Comment: restarted 1-2019: cardio 3-5 x a week elliptical x 20 minutes, stationary bike x 40 minutes, 4 days a week does wts x 1 hr   Social History Narrative    Wife Lucero Kidd is a PAFP of Dr Laine Upton  Immunization History   Administered Date(s) Administered    Pneumococcal Polysaccharide (PPSV-23) 05/22/2019    TDAP Vaccine 01/04/2011         FAMILY HISTORY     Family History   Problem Relation Age of Onset    Diabetes Father     Cancer Mother 80        bladder cancer removed 1-2018    Breast Cancer Mother         mastectomy 2012    No Known Problems Sister     No Known Problems Brother     Stroke Maternal Aunt          VITALS     Visit Vitals  /82 (BP 1 Location: Right arm, BP Patient Position: Sitting)   Pulse 60   Temp 97.9 °F (36.6 °C) (Oral)   Resp 18   Ht 6' 1\" (1.854 m)   Wt 202 lb 4 oz (91.7 kg)   SpO2 97%   BMI 26.68 kg/m²          PHYSICAL EXAMINATION   Physical Exam   Constitutional: He is well-developed, well-nourished, and in no distress. Cardiovascular: Normal rate and regular rhythm. Pulmonary/Chest: Effort normal and breath sounds normal. No respiratory distress. Skin: Skin is warm. Currently in office today the affected area involves B thighs: there are a few scattered lightly erythematous to pink urticarial wheals and welts. +Dermatographism. No generalized edema. No vesicles. No pustules. Vitals reviewed. LABORATORY DATA     Results for orders placed or performed in visit on 05/22/19   CBC W/O DIFF   Result Value Ref Range    WBC 7.8 3.4 - 10.8 x10E3/uL    RBC 4.80 4. 14 - 5.80 x10E6/uL    HGB 13.6 13.0 - 17.7 g/dL    HCT 40.6 37.5 - 51.0 %    MCV 85 79 - 97 fL    MCH 28.3 26.6 - 33.0 pg    MCHC 33.5 31.5 - 35.7 g/dL    RDW 14.0 12.3 - 15.4 %    PLATELET 171 096 - 148 x10E3/uL   LIPID PANEL   Result Value Ref Range    Cholesterol, total 181 100 - 199 mg/dL    Triglyceride 103 0 - 149 mg/dL    HDL Cholesterol 49 >39 mg/dL    VLDL, calculated 21 5 - 40 mg/dL    LDL, calculated 111 (H) 0 - 99 mg/dL   METABOLIC PANEL, COMPREHENSIVE   Result Value Ref Range    Glucose 83 65 - 99 mg/dL    BUN 16 6 - 24 mg/dL    Creatinine 1.10 0.76 - 1.27 mg/dL    GFR est non-AA 75 >59 mL/min/1.73    GFR est AA 87 >59 mL/min/1.73    BUN/Creatinine ratio 15 9 - 20    Sodium 140 134 - 144 mmol/L    Potassium 4.2 3.5 - 5.2 mmol/L    Chloride 102 96 - 106 mmol/L    CO2 22 20 - 29 mmol/L    Calcium 9.6 8.7 - 10.2 mg/dL    Protein, total 6.8 6.0 - 8.5 g/dL    Albumin 4.6 3.5 - 5.5 g/dL    GLOBULIN, TOTAL 2.2 1.5 - 4.5 g/dL    A-G Ratio 2.1 1.2 - 2.2    Bilirubin, total 0.9 0.0 - 1.2 mg/dL    Alk. phosphatase 92 39 - 117 IU/L    AST (SGOT) 25 0 - 40 IU/L    ALT (SGPT) 20 0 - 44 IU/L   TSH 3RD GENERATION   Result Value Ref Range    TSH 1.120 0.450 - 4.500 uIU/mL   URIC ACID   Result Value Ref Range    Uric acid 7.5 3.7 - 8.6 mg/dL   VITAMIN D, 25 HYDROXY   Result Value Ref Range    VITAMIN D, 25-HYDROXY 33.3 30.0 - 100.0 ng/mL   PSA W/ REFLX FREE PSA   Result Value Ref Range    Prostate Specific Ag 0.4 0.0 - 4.0 ng/mL    Reflex Criteria Comment    CVD REPORT   Result Value Ref Range    INTERPRETATION Note           ASSESSMENT & PLAN       ICD-10-CM ICD-9-CM    1. Urticaria-mild-moderate, episodic; inciting trigger unknown L50.9 708. 9      Zyrtec qpm and continue Allegra once daily in the AM  Zantac 150 mg bid  Avoid excess/prolonged heat exposure  Cool showers  Aveeno body wash and lotion  Cortizone Cream to affected areas tid prn  Continue therapy until areas completely resolved  Reviewed medications, effects, precautions, and potential side effects in detail  Call back or follow up if symptoms worsen or fail to improve along expectant course, or sooner should any worsening, new, or concerning symptoms ensue

## 2019-06-18 NOTE — PROGRESS NOTES
Chief Complaint   Patient presents with    Itching     all over body, red areas all over. tried otc cortisone creams     1. Have you been to the ER, urgent care clinic since your last visit? Hospitalized since your last visit? No    2. Have you seen or consulted any other health care providers outside of the [address] [place] [address] since your last visit? Include any pap smears or colon screening.  No

## 2019-06-18 NOTE — PATIENT INSTRUCTIONS
Chronic Hives: Care Instructions Your Care Instructions Chronic hives are long-lasting raised, red, and itchy patches of skin called wheals or welts. This condition is also called chronic urticaria. Hives usually have red borders and pale centers. They range in size from ¼ inch to 3 inches or more across. They may seem to move from place to place on the skin. Several hives may join to form a large area of raised, red skin. When hives and swelling last more than 6 weeks even with treatment, they are called chronic. A single spot of hives may last less than 36 hours, but the problem may come and go for weeks or months. In most people, the problem often lasts less than 1 year and almost always goes away within 5 years. Hives may occur with swelling under the skin (called angioedema). But you may have swelling without hives. Swelling may hurt a bit, but it does not usually itch like hives. It can be dangerous if severe swelling affects your throat, but this is very rare. You cannot spread hives to other people. Follow-up care is a key part of your treatment and safety. Be sure to make and go to all appointments, and call your doctor if you are having problems. It's also a good idea to know your test results and keep a list of the medicines you take. How can you care for yourself at home? · Avoid whatever you think may have caused your hives, such as a certain food or medicine. But you may not know the cause. · Put a cool, wet towel on the area to relieve itching. · Your doctor may suggest an over-the-counter antihistamine, such as cetirizine (Zyrtec), diphenhydramine (Benadryl), or loratadine (Claritin), to help control the hives and swelling. Read and follow all instructions on the label. These medicines can make you feel sleepy. Do not drive while using them. · Your doctor may prescribe a shot of epinephrine to carry with you in case you have a severe reaction.  Learn how to give yourself the shot, and keep it with you at all times. Make sure it has not . · If your doctor prescribes another medicine, take it exactly as directed. When should you call for help? Give an epinephrine shot if: 
  · You think you are having a severe allergic reaction.  
 After giving an epinephrine shot call 911, even if you feel better. 
 Call 911 if: 
  · You have symptoms of a severe allergic reaction. These may include: 
? Sudden raised, red areas (hives) all over your body. ? Swelling of the throat, mouth, lips, or tongue. ? Trouble breathing. ? Passing out (losing consciousness). Or you may feel very lightheaded or suddenly feel weak, confused, or restless.  
  · You have been given an epinephrine shot, even if you feel better.  
 Call your doctor now or seek immediate medical care if: 
  · Your hives get worse.  
 Watch closely for changes in your health, and be sure to contact your doctor if: 
  · You do not get better as expected. Where can you learn more? Go to http://kae-patric.info/. Enter B356 in the search box to learn more about \"Chronic Hives: Care Instructions. \" Current as of: 2018 Content Version: 11.9 © 5967-1340 [employer]. Care instructions adapted under license by [employer] (which disclaims liability or warranty for this information). If you have questions about a medical condition or this instruction, always ask your healthcare professional. [place] [address] any warranty or liability for your use of this information.

## 2019-07-22 RX ORDER — FLUOXETINE HYDROCHLORIDE 20 MG/1
CAPSULE ORAL
Qty: 90 CAP | Refills: 3 | Status: SHIPPED | OUTPATIENT
Start: 2019-07-22 | End: 2020-09-15

## 2019-09-21 RX ORDER — MONTELUKAST SODIUM 10 MG/1
TABLET ORAL
Qty: 90 TAB | Refills: 3 | Status: SHIPPED | OUTPATIENT
Start: 2019-09-21 | End: 2020-11-10

## 2020-01-28 ENCOUNTER — TELEPHONE (OUTPATIENT)
Dept: FAMILY MEDICINE CLINIC | Age: 55
End: 2020-01-28

## 2020-01-28 NOTE — TELEPHONE ENCOUNTER
Called pt and he states that he is already doing the ibuprofen. He was hoping the his insurance would pay for the celebrex. He said to disregard. B/c the celebrex helped the best.  I let him know about the [employer]. He will look into that. I called [facility] and they asked about the etodolac and the mobic. Told them that the clebrex worked good for the pt. She will fax over a form for prior auth. She gave me a # to call if needed. [phone]      Completed the prior auth form and got approval.  Called pt to let him know.   Case # [id number] good thru 1/1/20- 1/30/21

## 2020-02-07 ENCOUNTER — TELEPHONE (OUTPATIENT)
Dept: FAMILY MEDICINE CLINIC | Age: 55
End: 2020-02-07

## 2020-02-07 DIAGNOSIS — G47.33 OSA (OBSTRUCTIVE SLEEP APNEA): Primary | ICD-10-CM

## 2020-02-07 NOTE — TELEPHONE ENCOUNTER
----- Message from Tino Smart. Givens sent at 2/7/2020 10:14 AM EST -----  Regarding: Referral Request  Contact: [phone]  Dr. Cricket Faustin, I have an appointment with [facility] on March 12. They are requesting that my PCP send them an insurance referral to them prior to my appointment. I am just trying to get a prescription for a dental appliance for snoring. I tried a CPAP machine but could not get used to it. But that was over 10 years ago, so I need to go through the testing again. I am going to [facility] ([address] [place] [address], [address] [facility]) and their fax number is:  [phone]. My appointment is with Dr. Sadia Moe at 9:40 AM.  Let me know if there is anything else you need from me. Thank you.

## 2020-02-10 NOTE — TELEPHONE ENCOUNTER
Patient insurance does not require a referral. Mr. Ryan has a ppo plan. He may need & order sent to Dr. Sweet Laws office.

## 2020-03-12 ENCOUNTER — OFFICE VISIT (OUTPATIENT)
Dept: SLEEP MEDICINE | Age: 55
End: 2020-03-12

## 2020-03-12 VITALS
OXYGEN SATURATION: 100 % | TEMPERATURE: 97.1 F | HEIGHT: 73 IN | BODY MASS INDEX: 26.77 KG/M2 | DIASTOLIC BLOOD PRESSURE: 65 MMHG | HEART RATE: 58 BPM | WEIGHT: 202 LBS | SYSTOLIC BLOOD PRESSURE: 116 MMHG

## 2020-03-12 DIAGNOSIS — Z86.59 H/O: DEPRESSION: ICD-10-CM

## 2020-03-12 DIAGNOSIS — G47.33 OSA (OBSTRUCTIVE SLEEP APNEA): Primary | ICD-10-CM

## 2020-03-12 DIAGNOSIS — J45.30 MILD PERSISTENT ASTHMA WITHOUT COMPLICATION: ICD-10-CM

## 2020-03-12 NOTE — PROGRESS NOTES
[address] [facility] [place]. [place], [address]  Tel.  [phone]  Fax. 100 [facility] 60  [place], [address] [facility]  Tel.  [phone]  Fax. [phone] [address]   Tel.  [phone]  Fax. [phone]         Subjective:      Marissa Jackman is an 62 y.o. male referred for evaluation for a sleep disorder. He complains of snoring associated with snorting. Symptoms began several years ago, he was diagnosed with ISMAEL in 2008 and prescribed CPAP therapy which he was unable to tolerate and switched to an oral appliance purchased online. He now interested in getting a formal dental evaluation to get a custom fabricated oral appliance. He usually can fall asleep in 5-10 minutes. Family or house members note snoring. He denies completely or partially paralyzed while falling asleep or waking up. Marissa Jackman does not wake up frequently at night. He is not bothered by waking up too early and left unable to get back to sleep. He actually sleeps about 7 hours at night and wakes up about 6 times during the night. He does work shifts:  First Shift. Urrutia Ask indicates he does not get too little sleep at night. His bedtime is 2100. He awakens at 0500. He does take naps. He takes 2 naps a week lasting 30 to 45 minutes. He has the following observed behaviors: Loud snoring, Light snoring, Grinding teeth, Twitching of legs or feet;  .   Other remarks: waking with gasp or snort     [place] Sleepiness Score: 5     No Known Allergies      Current Outpatient Medications:     celecoxib (CELEBREX) 100 mg capsule, Take 1 Cap by mouth two (2) times daily as needed for Pain., Disp: 180 Cap, Rfl: 0    allopurinoL (ZYLOPRIM) 100 mg tablet, TAKE 1 TABLET DAILY, Disp: 90 Tab, Rfl: 1    ASMANEX TWISTHALER 220 mcg/ actuation (30) inhaler, USE 1 INHALATION DAILY (DISCARD 45 DAYS AFTER OPENING), Disp: 3 Each, Rfl: 3    montelukast (SINGULAIR) 10 mg tablet, TAKE 1 TABLET DAILY, Disp: 90 Tab, Rfl: 3    FLUoxetine (PROZAC) 20 mg capsule, TAKE 1 CAPSULE DAILY, Disp: 90 Cap, Rfl: 3    fexofenadine HCl (ALLEGRA PO), Take 180 mg by mouth daily. , Disp: , Rfl:     calcium carbonate/vitamin D3 (CALCIUM+D PO), Take  by mouth daily. , Disp: , Rfl:     ferrous sulfate (IRON PO), Take  by mouth daily. Self started 1-2019, Disp: , Rfl:     omeprazole (PRILOSEC) 20 mg capsule, TAKE 1 CAPSULE DAILY, Disp: 90 Cap, Rfl: 3    multivitamin (ONE A DAY) tablet, Take 1 Tab by mouth daily. , Disp: , Rfl:     fluticasone (FLONASE) 50 mcg/actuation nasal spray, USE 2 SPRAYS IN EACH NOSTRIL DAILY, Disp: 48 g, Rfl: 3     He  has a past medical history of Allergic asthma, Chronic low back pain with left-sided sciatica (5/15/2018), DDD (degenerative disc disease), lumbar (5/15/2018), Depression (6/99), GERD with stricture (5/22/2019), Gout ( ), Mild persistent asthma without complication (8/58/1115), ISMAEL (obstructive sleep apnea) (5/15/2018), Peptic ulcer disease, Protrusion of lumbar intervertebral disc (5/15/2018), and Sleep apnea, obstructive ( ). He  has a past surgical history that includes hx vasectomy (12/2013); hx endoscopy (2012); hx colonoscopy (12/22/2014); and hx orthopaedic (Left, 11/12/2018). He family history includes Breast Cancer in his mother; Cancer (age of onset: 80) in his mother; Diabetes in his father; No Known Problems in his brother and sister; Stroke in his maternal aunt. He  reports that he has never smoked. He has never used smokeless tobacco. He reports current alcohol use of about 0.8 standard drinks of alcohol per week. He reports that he does not use drugs.      Review of Systems:  Constitutional:  No significant weight loss or weight gain  Eyes:  No blurred vision  CVS:  No significant chest pain  Pulm:  No significant shortness of breath  GI:  No significant nausea or vomiting  :  No significant nocturia  Musculoskeletal:  No significant joint pain at night  Skin:  No significant rashes  Neuro:  No significant dizziness   Psych:  No active mood issues    Sleep Review of Systems: notable for no difficulty falling asleep; infrequent awakenings at night;  regular dreaming noted; no nightmares ; no early morning headaches; no memory problems; no concentration issues; no history of any automobile or occupational accidents due to daytime drowsiness. Objective:     Visit Vitals  /65   Pulse (!) 58   Temp 97.1 °F (36.2 °C)   Ht 6' 1\" (1.854 m)   Wt 202 lb (91.6 kg)   SpO2 100%   BMI 26.65 kg/m²         General:   Not in acute distress   Eyes:  Anicteric sclerae, no obvious strabismus   Nose:  No obvious nasal septum deviation    Oropharynx:   Class 4 oropharyngeal outlet, thick tongue base, uvula could not be seen due to low-lying soft palate, narrow tonsilo-pharyngeal pilars   Tonsils:   tonsils are not seen due to low-lying soft palate   Neck:   Neck circ. in \"inches\": 16; midline trachea   Chest/Lungs:  Equal lung expansion, clear on auscultation    CVS:  Normal rate, regular rhythm; no JVD   Skin:  Warm to touch; no obvious rashes   Neuro:  No focal deficits ; no obvious tremor    Psych:  Normal affect,  normal countenance;          Assessment:       ICD-10-CM ICD-9-CM    1. ISMAEL (obstructive sleep apnea) G47.33 327.23 SLEEP STUDY UNATTENDED, 4 CHANNEL   2. Mild persistent asthma without complication X66.94 218.25    3. BMI 26.0-26.9,adult Z68.26 V85.22    4. H/O: depression Z86.59 V11.8          Plan:     * The patient currently has a Low Risk for having sleep apnea. STOP-BANG score 3.  * Sleep testing was ordered for initial evaluation. * He was provided information on sleep apnea including coresponding risk factors and the importance of proper treatment. * Treatment options if indicated were reviewed today. Patient agrees to a trial of OAT (Dr. Aldo Camacho DDS) / PAP therapy if indicated.   * Counseling was provided regarding proper sleep hygiene (including effect of light on sleep) and safe driving. * Effect of sleep disturbance on weight was reviewed. We have recommended a dedicated weight loss through appropriate diet and an exercise regiment as significant weight reduction has been shown to reduce severity of obstructive sleep apnea. * Patient agrees to telephone [phone]  follow-up by myself or lead sleep technologist shortly after sleep study to review results and plan final management.     (patient has given permission for a message to be left regarding test results and further management if patient cannot be cannot be reached directly). Thank you for allowing us to participate in your patient's medical care. We'll keep you updated on these investigations. Zaid Lutz MD, FAASM  Electronically signed.  03/12/20

## 2020-03-12 NOTE — PROGRESS NOTES
· Patient was educated on proper hookup and operation of the HST. · Instruction forms and documentation were reviewed and signed. · The patient demonstrated good understanding of the HST. · General information regarding operations and maintenance of the device was provided. · He was provided information on sleep apnea including coresponding risk factors and the importance of proper treatment. · Follow-up appointment was made to return the HST. He will be contacted once the results have been reviewed. · He was asked to contact our office for any problems regarding his home sleep test study.     Mili Dennis,RRT,RPSGT, CSE

## 2020-03-12 NOTE — PATIENT INSTRUCTIONS
[address] [place]. [place], [address]  Tel.  [phone]  Fax. 100 [facility] 60  [place], [address] [facility]  Tel.  [phone]  Fax. [phone] [address]  Tel.  [phone]  Fax. [phone]     Sleep Apnea: After Your Visit  Your Care Instructions  Sleep apnea occurs when you frequently stop breathing for 10 seconds or longer during sleep. It can be mild to severe, based on the number of times per hour that you stop breathing or have slowed breathing. Blocked or narrowed airways in your nose, mouth, or throat can cause sleep apnea. Your airway can become blocked when your throat muscles and tongue relax during sleep. Sleep apnea is common, occurring in 1 out of 20 individuals. Individuals having any of the following characteristics should be evaluated and treated right away due to high risk and detrimental consequences from untreated sleep apnea:  1. Obesity  2. Congestive Heart failure  3. Atrial Fibrillation  4. Uncontrolled Hypertension  5. Type II Diabetes  6. Night-time Arrhythmias  7. Stroke  8. Pulmonary Hypertension  9. High-risk Driving Populations (pilots, truck drivers, etc.)  10. Patients Considering Weight-loss Surgery    How do you know you have sleep apnea? You probably have sleep apnea if you answer 'yes' to 3 or more of the following questions:  S - Have you been told that you Snore? T - Are you often Tired during the day? O - Has anyone Observed you stop breathing while sleeping? P- Do you have (or are being treated for) high blood Pressure? B - Are you obese (Body Mass Index > 35)? A - Is your Age 48years old or older? N - Is your Neck size greater than 16 inches? G - Are you male Gender? A sleep physician can prescribe a breathing device that prevents tissues in the throat from blocking your airway.  Or your doctor may recommend using a dental device (oral breathing device) to help keep your airway open. In some cases, surgery may be needed to remove enlarged tissues in the throat. Follow-up care is a key part of your treatment and safety. Be sure to make and go to all appointments, and call your doctor if you are having problems. It's also a good idea to know your test results and keep a list of the medicines you take. How can you care for yourself at home? · Lose weight, if needed. It may reduce the number of times you stop breathing or have slowed breathing. · Go to bed at the same time every night. · Sleep on your side. It may stop mild apnea. If you tend to roll onto your back, sew a pocket in the back of your pajama top. Put a tennis ball into the pocket, and stitch the pocket shut. This will help keep you from sleeping on your back. · Avoid alcohol and medicines such as sleeping pills and sedatives before bed. · Do not smoke. Smoking can make sleep apnea worse. If you need help quitting, talk to your doctor about stop-smoking programs and medicines. These can increase your chances of quitting for good. · Prop up the head of your bed 4 to 6 inches by putting bricks under the legs of the bed. · Treat breathing problems, such as a stuffy nose, caused by a cold or allergies. · Use a continuous positive airway pressure (CPAP) breathing machine if lifestyle changes do not help your apnea and your doctor recommends it. The machine keeps your airway from closing when you sleep. · If CPAP does not help you, ask your doctor whether you should try other breathing machines. A bilevel positive airway pressure machine has two types of air pressureâone for breathing in and one for breathing out. Another device raises or lowers air pressure as needed while you breathe. · If your nose feels dry or bleeds when using one of these machines, talk with your doctor about increasing moisture in the air. A humidifier may help.   · If your nose is runny or stuffy from using a breathing machine, talk with your doctor about using decongestants or a corticosteroid nasal spray. When should you call for help? Watch closely for changes in your health, and be sure to contact your doctor if:  · You still have sleep apnea even though you have made lifestyle changes. · You are thinking of trying a device such as CPAP. · You are having problems using a CPAP or similar machine. Where can you learn more? Go to Shoopi. Enter R466 in the search box to learn more about \"Sleep Apnea: After Your Visit. \"   © 8136-6700 Healthwise, Incorporated. Care instructions adapted under license by [facility] (which disclaims liability or warranty for this information). This care instruction is for use with your licensed healthcare professional. If you have questions about a medical condition or this instruction, always ask your healthcare professional. [place] any warranty or liability for your use of this information. PROPER SLEEP HYGIENE    What to avoid  · Do not have drinks with caffeine, such as coffee or black tea, for 8 hours before bed. · Do not smoke or use other types of tobacco near bedtime. Nicotine is a stimulant and can keep you awake. · Avoid drinking alcohol late in the evening, because it can cause you to wake in the middle of the night. · Do not eat a big meal close to bedtime. If you are hungry, eat a light snack. · Do not drink a lot of water close to bedtime, because the need to urinate may wake you up during the night. · Do not read or watch TV in bed. Use the bed only for sleeping and sexual activity. What to try  · Go to bed at the same time every night, and wake up at the same time every morning. Do not take naps during the day. · Keep your bedroom quiet, dark, and cool. · Get regular exercise, but not within 3 to 4 hours of your bedtime. .  · Sleep on a comfortable pillow and mattress.   · If watching the clock makes you anxious, turn it facing away from you so you cannot see the time. · If you worry when you lie down, start a worry book. Well before bedtime, write down your worries, and then set the book and your concerns aside. · Try meditation or other relaxation techniques before you go to bed. · If you cannot fall asleep, get up and go to another room until you feel sleepy. Do something relaxing. Repeat your bedtime routine before you go to bed again. · Make your house quiet and calm about an hour before bedtime. Turn down the lights, turn off the TV, log off the computer, and turn down the volume on music. This can help you relax after a busy day. Drowsy Driving  The [address] [place] [address] Road Traffic Safety Administration cites drowsiness as a causing factor in more than 401,641 police reported crashes annually, resulting in 76,000 injuries and 1,500 deaths. Other surveys suggest 55% of people polled have driven while drowsy in the past year, 23% had fallen asleep but not crashed, 3% crashed, and 2% had and accident due to drowsy driving. Who is at risk? Young Drivers: One study of drowsy driving accidents states that 55% of the drivers were under 25 years. Of those, 75% were male. Shift Workers and Travelers: People who work overnight or travel across time zones frequently are at higher risk of experiencing Circadian Rhythm Disorders. They are trying to work and function when their body is programed to sleep. Sleep Deprived: Lack of sleep has a serious impact on your ability to pay attention or focus on a task. Consistently getting less than the average of 8 hours your body needs creates partial or cumulative sleep deprivation. Untreated Sleep Disorders: Sleep Apnea, Narcolepsy, R.L.S., and other sleep disorders (untreated) prevent a person from getting enough restful sleep. This leads to excessive daytime sleepiness and increases the risk for drowsy driving accidents by up to 7 times.   Medications / Alcohol: Even over the counter medications can cause drowsiness. Medications that impair a drivers attention should have a warning label. Alcohol naturally makes you sleepy and on its own can cause accidents. Combined with excessive drowsiness its effects are amplified. Signs of Drowsy Driving:   * You don't remember driving the last few miles   * You may drift out of your yovani   * You are unable to focus and your thoughts wander   * You may yawn more often than normal   * You have difficulty keeping your eyes open / nodding off   * Missing traffic signs, speeding, or tailgating  Prevention-   Good sleep hygiene, lifestyle and behavioral choices have the most impact on drowsy driving. There is no substitute for sleep and the average person requires 8 hours nightly. If you find yourself driving drowsy, stop and sleep. Consider the sleep hygiene tips provided during your visit as well. Medication Refill Policy: Refills for all medications require 1 week advance notice. Please have your pharmacy fax a refill request. We are unable to fax, or call in \"controled substance\" medications and you will need to pick these prescriptions up from our office. [employer] Activation    Thank you for requesting access to [employer]. Please follow the instructions below to securely access and download your online medical record. R[employer]Times allows you to send messages to your doctor, view your test results, renew your prescriptions, schedule appointments, and more. How Do I Sign Up? 1. In your internet browser, go to [email or web address]. 2. Click on the First Time User? Click Here link in the Sign In box. You will see the New Member Sign Up page. 3. Enter your RacerTimes Access Code exactly as it appears below. You will not need to use this code after youve completed the sign-up process. If you do not sign up before the expiration date, you must request a new code. [employer]Times Access Code:  Activation code not generated  Current RacerTimes Status: Active (This is the date your [employer] access code will )    4. Enter the last four digits of your Social Security Number (xxxx) and Date of Birth (mm/dd/yyyy) as indicated and click Submit. You will be taken to the next sign-up page. 5. Create a [employer] ID. This will be your Intercasting login ID and cannot be changed, so think of one that is secure and easy to remember. 6. Create a [employer]casting password. You can change your password at any time. 7. Enter your Password Reset Question and Answer. This can be used at a later time if you forget your password. 8. Enter your e-mail address. You will receive e-mail notification when new information is available in [address] 9. Click Sign Up. You can now view and download portions of your medical record. 10. Click the Download Summary menu link to download a portable copy of your medical information. Additional Information    If you have questions, please call [phone]. Remember, [employer]casting is NOT to be used for urgent needs. For medical emergencies, dial 911.

## 2020-03-17 ENCOUNTER — DOCUMENTATION ONLY (OUTPATIENT)
Dept: SLEEP MEDICINE | Age: 55
End: 2020-03-17

## 2020-03-17 ENCOUNTER — TELEPHONE (OUTPATIENT)
Dept: SLEEP MEDICINE | Age: 55
End: 2020-03-17

## 2020-03-17 DIAGNOSIS — G47.33 OSA (OBSTRUCTIVE SLEEP APNEA): Primary | ICD-10-CM

## 2020-03-17 NOTE — TELEPHONE ENCOUNTER
Macario Morris is to be contacted by lead sleep technologist regarding results of Sleep Testing which was indicative of an average AHI of 20 per hour with an SpO2 ruben of 85% and SpO2 of < 88% being 0 minutes. * Treatment options were offered at initial visit. He had elected to proceed with a trial of using an Oral Device (Mandibular Advancing Device, Tongue Retention Device, etc.) which has been shown to be effective treatment for obstructive sleep apnea. * We have referred the patient to Dentistry for oral appliance evaluation. Follow-up office visit and re-testing to be done in 3-4 months after initiation of oral appliance therapy to assess efficacy of therapy. Encounter Diagnosis   Name Primary?  ISMAEL (obstructive sleep apnea) Yes       Orders Placed This Encounter    AMB SUPPLY ORDER     Diagnosis: Obstructive Sleep Apnea (ISMAEL) ICD-10 Code (G47.33). Perform custom fabrication of a Mandibular Advancement Device for treatment of ISMAEL.        Provider: NADER Harris MD, FAA; NPI: [id number]  Electronically signed.  03/18/20

## 2020-07-16 DIAGNOSIS — M54.42 CHRONIC BILATERAL LOW BACK PAIN WITH LEFT-SIDED SCIATICA: Primary | ICD-10-CM

## 2020-07-16 DIAGNOSIS — M54.16 LUMBAR RADICULITIS: ICD-10-CM

## 2020-07-16 DIAGNOSIS — G89.29 CHRONIC BILATERAL LOW BACK PAIN WITH LEFT-SIDED SCIATICA: Primary | ICD-10-CM

## 2020-07-16 DIAGNOSIS — M51.26 PROTRUSION OF LUMBAR INTERVERTEBRAL DISC: ICD-10-CM

## 2020-07-16 DIAGNOSIS — M51.36 DDD (DEGENERATIVE DISC DISEASE), LUMBAR: ICD-10-CM

## 2020-07-17 NOTE — TELEPHONE ENCOUNTER
Patient past due annual check. Last annual check 5-2019. Nothing scheduled. Please schedule appt asap. Would he have enough of this medication to last til then?

## 2020-07-20 NOTE — TELEPHONE ENCOUNTER
Outbound call to patient.  Left message to return call back to the office regarding medication refill request.

## 2020-07-31 RX ORDER — CELECOXIB 100 MG/1
CAPSULE ORAL
Qty: 180 CAP | Refills: 0 | Status: SHIPPED | OUTPATIENT
Start: 2020-07-31 | End: 2021-07-21 | Stop reason: CLARIF

## 2020-08-11 ENCOUNTER — VIRTUAL VISIT (OUTPATIENT)
Dept: SLEEP MEDICINE | Age: 55
End: 2020-08-11
Payer: COMMERCIAL

## 2020-08-11 DIAGNOSIS — Z86.59 H/O: DEPRESSION: ICD-10-CM

## 2020-08-11 DIAGNOSIS — G47.33 OSA (OBSTRUCTIVE SLEEP APNEA): Primary | ICD-10-CM

## 2020-08-11 PROCEDURE — 99213 OFFICE O/P EST LOW 20 MIN: CPT | Performed by: INTERNAL MEDICINE

## 2020-08-11 NOTE — PROGRESS NOTES
[address] [facility] Bob. [place], [address]  Tel.  [phone]  Fax. 100 [facility] 60  [place], [address]  Tel.  [phone]  Fax. [phone] [address]  Tel.  [phone]  Fax. [phone]       S>Jose Castañeda Patient is a 62 y.o. male who was seen by synchronous (real-time) audio-video technology on 8/11/2020. Consent:  He is aware that this patient-initiated Telehealth encounter is a billable service, with coverage as determined by his insurance carrier. He is aware that he may receive a bill and has provided verbal consent to proceed: Yes    I was in the office while conducting this encounter. Patient verified with 's License. Pako Wall is an 62 y.o. male who has been diagnosed as having obstructive sleep apnea. Initial Apnea/Hypopnea index was 20 which indicates moderate apnea. He opted to have the disorder treated with an oral appliance. He was referred to Dr. Aston Bose for a mandibular advancement device which is stated as being used 100 percent of the time. He denies snoring, periods of not breathing. He reports of sleeping well and wakes up refreshed on most morning. Shy Em does not wake up frequently at night. He does not work shifts:  .   Elsa Sim indicates he does not get too little sleep at night. Sleep quality has improved. Sleep duration has increased as has level of alertness during the day. Sleep Review of Systems: notable for no difficulty falling asleep; infrequent awakenings at night;  regular dreaming noted; no nightmares ; no early morning headaches; no memory problems; no concentration issues; patient denies of being drowsy while driving.     No Known Allergies    He has a current medication list which includes the following prescription(s): celecoxib, allopurinol, asmanex twisthaler, montelukast, fluoxetine, fexofenadine hcl, calcium carbonate/vitamin d3, ferrous sulfate, omeprazole, multivitamin, and fluticasone propionate. .      He  has a past medical history of Allergic asthma, Chronic low back pain with left-sided sciatica (5/15/2018), DDD (degenerative disc disease), lumbar (5/15/2018), Depression (6/99), GERD with stricture (5/22/2019), Gout ( ), Mild persistent asthma without complication (9/20/0236), ISMAEL (obstructive sleep apnea) (5/15/2018), Peptic ulcer disease, Protrusion of lumbar intervertebral disc (5/15/2018), and Sleep apnea, obstructive ( ).      O>    Vitals reported by patient. Patient-Reported Vitals 8/11/2020   Patient-Reported Weight 203 lbs   Patient-Reported Height 6' 1\"       Physical Exam completed by visual and auditory observation of patient with verbal input from patient. General:   Alert, oriented, not in acute distress   Eyes:  Anicteric Sclerae; no obvious strabismus   Nose:  No obvious nasal septum deviation    Neck:   Midline trachea, no visible mass   Chest/Lungs:  Respiratory effort normal, no visualized signs of difficulty breathing or respiratory distress   CVS:  No JVD   Extremities:  No obvious rashes noted on face, neck, or hands   Neuro:  No facial asymmetry, no focal deficits; no obvious tremor    Psych:  Normal affect,  normal countenance           A>    ICD-10-CM ICD-9-CM    1. ISMAEL (obstructive sleep apnea)  G47.33 327.23    2. H/O: depression  Z86.59 V11.8    3. BMI 26.0-26.9,adult  Z68.26 V85.22        P>  * Home sleep testing was ordered today to objectively assess for sleep disordered breathing on current therapy.     Orders Placed This Encounter    SLEEP STUDY UNATTENDED, 4 CHANNEL     Order Specific Question:   Reason for Exam     Answer:   Assess efficacy of oral appliance therapy       * Telephone [phone]  follow-up shortly after sleep study to review results.   (patient has given permission for a message to be left regarding test results and further management if patient cannot be cannot be reached directly). * Counseling was provided regarding safe driving and proper sleep hygiene. * Counseling was provided regarding the importance of regular therapy and follow-up with dentist as per their recommendation and with us in 1 year or as needed. * Patient was asked to contact our office at any time for further questions regarding their sleep symptoms. Office visit exceeded 15 minutes with counseling and direction of care taking up more than 50% of the allotted time. Thank you for allowing to participate in your patient's medical care. Pursuant to the emergency declaration under the [facility], [facility] waiver authority and the [employer] and [employer] Act, this Virtual Visit was conducted, with patient's consent, to reduce the patient's risk of exposure to COVID-19 and provide continuity of care for an established patient. Services were provided through a video synchronous discussion virtually to substitute for in-person clinic visit. Stephen Us MD, FAASM  Electronically signed.  08/11/20

## 2020-08-11 NOTE — PATIENT INSTRUCTIONS
[address] [facility]. [address] [facility], [address] Tel.  [phone] Fax. 100 [facility] 60 [place], [address] [facility] Tel.  [phone] Fax. [phone] [address] [employer] Vidhya Fairchild Tel.  [phone] Fax. [phone] Sleep Apnea: After Your Visit Your Care Instructions Sleep apnea occurs when you frequently stop breathing for 10 seconds or longer during sleep. It can be mild to severe, based on the number of times per hour that you stop breathing or have slowed breathing. Blocked or narrowed airways in your nose, mouth, or throat can cause sleep apnea. Your airway can become blocked when your throat muscles and tongue relax during sleep. Sleep apnea is common, occurring in 1 out of 20 individuals. Individuals having any of the following characteristics should be evaluated and treated right away due to high risk and detrimental consequences from untreated sleep apnea: 
1. Obesity 2. Congestive Heart failure 3. Atrial Fibrillation 4. Uncontrolled Hypertension 5. Type II Diabetes 6. Night-time Arrhythmias 7. Stroke 8. Pulmonary Hypertension 9. High-risk Driving Populations (pilots, truck drivers, etc.) 10. Patients Considering Weight-loss Surgery How do you know you have sleep apnea? You probably have sleep apnea if you answer 'yes' to 3 or more of the following questions: S - Have you been told that you Snore? T - Are you often Tired during the day? O - Has anyone Observed you stop breathing while sleeping? P- Do you have (or are being treated for) high blood Pressure? B - Are you obese (Body Mass Index > 35)? A - Is your Age 48years old or older? N - Is your Neck size greater than 16 inches? G - Are you male Gender? A sleep physician can prescribe a breathing device that prevents tissues in the throat from blocking your airway.  Or your doctor may recommend using a dental device (oral breathing device) to help keep your airway open. In some cases, surgery may be needed to remove enlarged tissues in the throat. Follow-up care is a key part of your treatment and safety. Be sure to make and go to all appointments, and call your doctor if you are having problems. It's also a good idea to know your test results and keep a list of the medicines you take. How can you care for yourself at home? · Lose weight, if needed. It may reduce the number of times you stop breathing or have slowed breathing. · Go to bed at the same time every night. · Sleep on your side. It may stop mild apnea. If you tend to roll onto your back, sew a pocket in the back of your pajama top. Put a tennis ball into the pocket, and stitch the pocket shut. This will help keep you from sleeping on your back. · Avoid alcohol and medicines such as sleeping pills and sedatives before bed. · Do not smoke. Smoking can make sleep apnea worse. If you need help quitting, talk to your doctor about stop-smoking programs and medicines. These can increase your chances of quitting for good. · Prop up the head of your bed 4 to 6 inches by putting bricks under the legs of the bed. · Treat breathing problems, such as a stuffy nose, caused by a cold or allergies. · Use a continuous positive airway pressure (CPAP) breathing machine if lifestyle changes do not help your apnea and your doctor recommends it. The machine keeps your airway from closing when you sleep. · If CPAP does not help you, ask your doctor whether you should try other breathing machines. A bilevel positive airway pressure machine has two types of air pressureâone for breathing in and one for breathing out. Another device raises or lowers air pressure as needed while you breathe. · If your nose feels dry or bleeds when using one of these machines, talk with your doctor about increasing moisture in the air. A humidifier may help.  
· If your nose is runny or stuffy from using a breathing machine, talk with your doctor about using decongestants or a corticosteroid nasal spray. When should you call for help? Watch closely for changes in your health, and be sure to contact your doctor if: 
· You still have sleep apnea even though you have made lifestyle changes. · You are thinking of trying a device such as CPAP. · You are having problems using a CPAP or similar machine. Where can you learn more? Go to [employer]. Enter Q297 in the search box to learn more about \"Sleep Apnea: After Your Visit. \"  
© 6592-1801 Healthwise, Incorporated. Care instructions adapted under license by [facility] [employer] (which disclaims liability or warranty for this information). This care instruction is for use with your licensed healthcare professional. If you have questions about a medical condition or this instruction, always ask your healthcare professional. Gomez Alonzoing any warranty or liability for your use of this information. PROPER SLEEP HYGIENE What to avoid · Do not have drinks with caffeine, such as coffee or black tea, for 8 hours before bed. · Do not smoke or use other types of tobacco near bedtime. Nicotine is a stimulant and can keep you awake. · Avoid drinking alcohol late in the evening, because it can cause you to wake in the middle of the night. · Do not eat a big meal close to bedtime. If you are hungry, eat a light snack. · Do not drink a lot of water close to bedtime, because the need to urinate may wake you up during the night. · Do not read or watch TV in bed. Use the bed only for sleeping and sexual activity. What to try · Go to bed at the same time every night, and wake up at the same time every morning. Do not take naps during the day. · Keep your bedroom quiet, dark, and cool. · Get regular exercise, but not within 3 to 4 hours of your bedtime. Ney Chen · Sleep on a comfortable pillow and mattress. · If watching the clock makes you anxious, turn it facing away from you so you cannot see the time. · If you worry when you lie down, start a worry book. Well before bedtime, write down your worries, and then set the book and your concerns aside. · Try meditation or other relaxation techniques before you go to bed. · If you cannot fall asleep, get up and go to another room until you feel sleepy. Do something relaxing. Repeat your bedtime routine before you go to bed again. · Make your house quiet and calm about an hour before bedtime. Turn down the lights, turn off the TV, log off the computer, and turn down the volume on music. This can help you relax after a busy day. Drowsy Driving The [employer] cites drowsiness as a causing factor in more than 420,540 police reported crashes annually, resulting in 76,000 injuries and 1,500 deaths. Other surveys suggest 55% of people polled have driven while drowsy in the past year, 23% had fallen asleep but not crashed, 3% crashed, and 2% had and accident due to drowsy driving. Who is at risk? Young Drivers: One study of drowsy driving accidents states that 55% of the drivers were under 25 years. Of those, 75% were male. Shift Workers and Travelers: People who work overnight or travel across time zones frequently are at higher risk of experiencing Circadian Rhythm Disorders. They are trying to work and function when their body is programed to sleep. Sleep Deprived: Lack of sleep has a serious impact on your ability to pay attention or focus on a task. Consistently getting less than the average of 8 hours your body needs creates partial or cumulative sleep deprivation. Untreated Sleep Disorders: Sleep Apnea, Narcolepsy, R.L.S., and other sleep disorders (untreated) prevent a person from getting enough restful sleep.  This leads to excessive daytime sleepiness and increases the risk for drowsy driving accidents by up to 7 times. Medications / Alcohol: Even over the counter medications can cause drowsiness. Medications that impair a drivers attention should have a warning label. Alcohol naturally makes you sleepy and on its own can cause accidents. Combined with excessive drowsiness its effects are amplified. Signs of Drowsy Driving: * You don't remember driving the last few miles * You may drift out of your yovani * You are unable to focus and your thoughts wander * You may yawn more often than normal 
 * You have difficulty keeping your eyes open / nodding off * Missing traffic signs, speeding, or tailgating Prevention-  
Good sleep hygiene, lifestyle and behavioral choices have the most impact on drowsy driving. There is no substitute for sleep and the average person requires 8 hours nightly. If you find yourself driving drowsy, stop and sleep. Consider the sleep hygiene tips provided during your visit as well. Medication Refill Policy: Refills for all medications require 1 week advance notice. Please have your pharmacy fax a refill request. We are unable to fax, or call in \"controled substance\" medications and you will need to pick these prescriptions up from our office. Okeyko Activation Thank you for requesting access to Okeyko. Please follow the instructions below to securely access and download your online medical record. Okeyko allows you to send messages to your doctor, view your test results, renew your prescriptions, schedule appointments, and more. How Do I Sign Up? 1. In your internet browser, go to [email or web address]. 2. Click on the First Time User? Click Here link in the Sign In box. You will see the New Member Sign Up page. 3. Enter your Okeyko Access Code exactly as it appears below. You will not need to use this code after youve completed the sign-up process.  If you do not sign up before the expiration date, you must request a new code. CloudSway Access Code: Activation code not generated Current CloudSway Status: Active (This is the date your CloudSway access code will ) 4. Enter the last four digits of your Social Security Number (xxxx) and Date of Birth (mm/dd/yyyy) as indicated and click Submit. You will be taken to the next sign-up page. 5. Create a Umbrella Heret ID. This will be your CloudSway login ID and cannot be changed, so think of one that is secure and easy to remember. 6. Create a CloudSway password. You can change your password at any time. 7. Enter your Password Reset Question and Answer. This can be used at a later time if you forget your password. 8. Enter your e-mail address. You will receive e-mail notification when new information is available in [address] 9. Click Sign Up. You can now view and download portions of your medical record. 10. Click the Download Summary menu link to download a portable copy of your medical information. Additional Information If you have questions, please call [phone]. Remember, CloudSway is NOT to be used for urgent needs. For medical emergencies, dial 911.

## 2020-09-15 DIAGNOSIS — F32.A CHRONIC DEPRESSION: Primary | ICD-10-CM

## 2020-09-15 RX ORDER — FLUOXETINE HYDROCHLORIDE 20 MG/1
CAPSULE ORAL
Qty: 90 CAP | Refills: 0 | Status: SHIPPED | OUTPATIENT
Start: 2020-09-15 | End: 2020-12-29

## 2020-09-24 ENCOUNTER — OFFICE VISIT (OUTPATIENT)
Dept: FAMILY MEDICINE CLINIC | Age: 55
End: 2020-09-24
Payer: COMMERCIAL

## 2020-09-24 VITALS
DIASTOLIC BLOOD PRESSURE: 82 MMHG | HEIGHT: 73 IN | WEIGHT: 205.2 LBS | RESPIRATION RATE: 20 BRPM | HEART RATE: 55 BPM | SYSTOLIC BLOOD PRESSURE: 110 MMHG | TEMPERATURE: 98.6 F | BODY MASS INDEX: 27.2 KG/M2 | OXYGEN SATURATION: 100 %

## 2020-09-24 DIAGNOSIS — Z12.5 SCREENING PSA (PROSTATE SPECIFIC ANTIGEN): ICD-10-CM

## 2020-09-24 DIAGNOSIS — Z23 NEEDS FLU SHOT: ICD-10-CM

## 2020-09-24 DIAGNOSIS — E78.00 MILD HYPERCHOLESTEROLEMIA: ICD-10-CM

## 2020-09-24 DIAGNOSIS — Z00.00 ROUTINE GENERAL MEDICAL EXAMINATION AT A HEALTH CARE FACILITY: Primary | ICD-10-CM

## 2020-09-24 DIAGNOSIS — E79.0 HYPERURICEMIA: ICD-10-CM

## 2020-09-24 PROCEDURE — 99396 PREV VISIT EST AGE 40-64: CPT | Performed by: FAMILY MEDICINE

## 2020-09-24 PROCEDURE — 90471 IMMUNIZATION ADMIN: CPT

## 2020-09-24 PROCEDURE — 90686 IIV4 VACC NO PRSV 0.5 ML IM: CPT

## 2020-09-24 RX ORDER — CETIRIZINE HCL 10 MG
TABLET ORAL
COMMUNITY
End: 2021-07-21 | Stop reason: ALTCHOICE

## 2020-09-24 NOTE — PROGRESS NOTES
Chief Complaint   Patient presents with    Complete Physical     1. Have you been to the ER, urgent care clinic since your last visit? Hospitalized since your last visit? No    2. Have you seen or consulted any other health care providers outside of the [address] [place] [address] since your last visit? Include any pap smears or colon screening. No    3 most recent PHQ Screens 9/24/2020   Little interest or pleasure in doing things Not at all   Feeling down, depressed, irritable, or hopeless Not at all   Total Score PHQ 2 0     Abuse Screening Questionnaire 8/9/2017   Do you ever feel afraid of your partner? N   Are you in a relationship with someone who physically or mentally threatens you? N   Is it safe for you to go home?  Lise Guevara

## 2020-09-24 NOTE — PATIENT INSTRUCTIONS
Vaccine Information Statement Influenza (Flu) Vaccine (Inactivated or Recombinant): What You Need to Know Many Vaccine Information Statements are available in [place] and other languages. See www.immunize.org/vis Hojas de información sobre vacunas están disponibles en español y en muchos otros idiomas. Visite www.immunize.org/vis 1. Why get vaccinated? Influenza vaccine can prevent influenza (flu). Flu is a contagious disease that spreads around the [facility] every year, usually between October and May. Anyone can get the flu, but it is more dangerous for some people. Infants and young children, people 72years of age and older, pregnant women, and people with certain health conditions or a weakened immune system are at greatest risk of flu complications. Pneumonia, bronchitis, sinus infections and ear infections are examples of flu-related complications. If you have a medical condition, such as heart disease, cancer or diabetes, flu can make it worse. Flu can cause fever and chills, sore throat, muscle aches, fatigue, cough, headache, and runny or stuffy nose. Some people may have vomiting and diarrhea, though this is more common in children than adults. Each year thousands of people in the [facility] die from flu, and many more are hospitalized. Flu vaccine prevents millions of illnesses and flu-related visits to the doctor each year. 2. Influenza vaccines CDC recommends everyone 10months of age and older get vaccinated every flu season. Children 6 months through 6years of age may need 2 doses during a single flu season. Everyone else needs only 1 dose each flu season. It takes about 2 weeks for protection to develop after vaccination. There are many flu viruses, and they are always changing. Each year a new flu vaccine is made to protect against three or four viruses that are likely to cause disease in the upcoming flu season.  Even when the vaccine doesnt exactly match these viruses, it may still provide some protection. Influenza vaccine does not cause flu. Influenza vaccine may be given at the same time as other vaccines. 3. Talk with your health care provider Tell your vaccine provider if the person getting the vaccine: 
 Has had an allergic reaction after a previous dose of influenza vaccine, or has any severe, life-threatening allergies.  Has ever had Guillain-Barré Syndrome (also called GBS). In some cases, your health care provider may decide to postpone influenza vaccination to a future visit. People with minor illnesses, such as a cold, may be vaccinated. People who are moderately or severely ill should usually wait until they recover before getting influenza vaccine. Your health care provider can give you more information. 4. Risks of a reaction  Soreness, redness, and swelling where shot is given, fever, muscle aches, and headache can happen after influenza vaccine.  There may be a very small increased risk of Guillain-Barré Syndrome (GBS) after inactivated influenza vaccine (the flu shot). Radha Fariason children who get the flu shot along with pneumococcal vaccine (PCV13), and/or DTaP vaccine at the same time might be slightly more likely to have a seizure caused by fever. Tell your health care provider if a child who is getting flu vaccine has ever had a seizure. People sometimes faint after medical procedures, including vaccination. Tell your provider if you feel dizzy or have vision changes or ringing in the ears. As with any medicine, there is a very remote chance of a vaccine causing a severe allergic reaction, other serious injury, or death. 5. What if there is a serious problem? An allergic reaction could occur after the vaccinated person leaves the clinic.  If you see signs of a severe allergic reaction (hives, swelling of the face and throat, difficulty breathing, a fast heartbeat, dizziness, or weakness), call 9-1-1 and get the person to the nearest hospital. 
 
For other signs that concern you, call your health care provider. Adverse reactions should be reported to the Vaccine Adverse Event Reporting System (VAERS). Your health care provider will usually file this report, or you can do it yourself. Visit the VAERS website at www.vaers. hhs.gov or call 6-491.987.3940. VAERS is only for reporting reactions, and VAERS staff do not give medical advice. 6. The National Vaccine Injury Compensation Program 
 
The [facility] Vaccine Injury Compensation Program (VICP) is a federal program that was created to compensate people who may have been injured by certain vaccines. Visit the VICP website at www.hrsa.gov/vaccinecompensation or call 7-166.643.5535 to learn about the program and about filing a claim. There is a time limit to file a claim for compensation. 7. How can I learn more?  Ask your health care provider.  Call your local or state health department.  Contact the Centers for Disease Control and Prevention (CDC): 
- Call 5-530.918.9470 (1-800-CDC-INFO) or 
- Visit CDCs influenza website at www.cdc.gov/flu Vaccine Information Statement (Interim) Inactivated Influenza Vaccine 8/15/2019 
42 ALFONSO Wahl 521SO-66 Department of Health and [place] [employer] Centers for Disease Control and Prevention Office Use Only

## 2020-09-24 NOTE — PROGRESS NOTES
Chief Complaint   Patient presents with    Complete Physical     health screening form completion       HISTORY OF PRESENT ILLNESS   HPI  Annual CPE fasting w/ health form for work. Regular diet, nothing special.  He had not exercised from 3/2020-8/2020 due to Covid pandemic and having to telework from home w/ his gym closed. Since middle of last month he got a stationary bike and cycles 3-4 x a week x 20 miles each. His wt had gotten up to 207 lbs so he is working on getting it back down, personal goal ~ 195 lbs. Overall feels well and has no complaints or concerns at this time. Chronic conditions detailed below are stable and he is doing well on current medication regimen. REVIEW OF SYMPTOMS   Review of Systems   Constitutional: Negative. HENT: Negative. Eyes: Negative. Respiratory: Negative. Negative for shortness of breath. Asthma stable x years on maintenance Singulair and Asmanex once daily. He has not needed a rescue inhaler for many years. Cardiovascular: Negative. Negative for chest pain and palpitations. Gastrointestinal: Negative. Negative for abdominal pain, blood in stool, constipation, diarrhea, heartburn, melena, nausea and vomiting. Takes Prilosec daily. In past when trying to wean off, he gets back gerd/burning/chest pain and increased swallowing difficulty. Symptoms remain stable on Prilosec    Genitourinary: Negative. Denies nocturia, weak stream or change in urinary flow pattern   Musculoskeletal:        No gout flares. Takes Celebrex prn back pain   Neurological: Negative. Endo/Heme/Allergies: Negative. Environmental allergies well controlled on Zyrtec and Singulair   Psychiatric/Behavioral: Negative.          Mood good on current regimen of Prozac           PROBLEM LIST/MEDICAL HISTORY     Problem List  Date Reviewed: 9/24/2020          Codes Class Noted    GERD with stricture ICD-10-CM: K21.9, K22.2  ICD-9-CM: 530.81, 530.3  5/22/2019 Overview Signed 5/22/2019 11:23 AM by Paradise Lilly MD     EGD, esophageal dilatation ~2012             ISMAEL (obstructive sleep apnea) ICD-10-CM: G47.33  ICD-9-CM: 327.23  5/15/2018    Overview Signed 5/15/2018 12:09 PM by Paradise Lilly MD     ~5712; Intolerant of CPAP             Hyperuricemia ICD-10-CM: E79.0  ICD-9-CM: 790.6  5/15/2018        Mild persistent asthma without complication [facility]: M59.67  ICD-9-CM: 493.90  5/15/2018        Chronic low back pain with left-sided sciatica ICD-10-CM: M54.42, G89.29  ICD-9-CM: 724.2, 724.3, 338.29  5/15/2018    Overview Signed 5/15/2018  1:54 PM by Paradise Lilly MD     MRI 2016: Mild multilevel disc and facet degenerative change focused at L5-S1. Moderate broad-based protrusion at L5-S1 with mild canal stenosis and mild   effacement of nerve is extending to both the right and left S1 foramina.                Protrusion of lumbar intervertebral disc ICD-10-CM: M51.26  ICD-9-CM: 722.10  5/15/2018    Overview Signed 5/15/2018  1:54 PM by Paradise Lilly MD     MRI 2016: Mild multilevel disc and facet degenerative change focused at L5-S1. Moderate broad-based protrusion at L5-S1 with mild canal stenosis and mild   effacement of nerve is extending to both the right and left S1 foramina.              DDD (degenerative disc disease), lumbar ICD-10-CM: M51.36  ICD-9-CM: 722.52  5/15/2018        Lumbar radiculitis ICD-10-CM: M54.16  ICD-9-CM: 724.4  6/3/2016        S/P epidural steroid injection ICD-10-CM: Z92.241  ICD-9-CM: V45.89  6/3/2016    Overview Signed 6/3/2016 12:05 PM by Sarah Morin     Left foraminal with Dr. Yaa Felder on 6/1/16             Allergic asthma ICD-10-CM: J45.909  ICD-9-CM: 493.00  2/10/2010        Peptic ulcer disease ICD-10-CM: K27.9  ICD-9-CM: 533.90  2/10/2010        Depression ICD-10-CM: F32.9  ICD-9-CM: 151  2/10/2010        Gout ICD-10-CM: M10.9  ICD-9-CM: 274.9  2/10/2010                  PAST SURGICAL HISTORY     Past Surgical History:   Procedure Laterality Date    HX COLONOSCOPY  12/22/2014    Normal, follow up 10 yrs, Dr Ellen Vazquez HX ENDOSCOPY  2012    esophageal dilatation     HX ORTHOPAEDIC Left 11/12/2018    Left Thumb/CMC Joint repair, Becki Allan    HX VASECTOMY  12/2013         MEDICATIONS     Current Outpatient Medications   Medication Sig    cetirizine (ZYRTEC) 10 mg tablet Take  by mouth.  FLUoxetine (PROzac) 20 mg capsule TAKE 1 CAPSULE DAILY    celecoxib (CELEBREX) 100 mg capsule TAKE 1 CAPSULE TWICE A DAY AS NEEDED FOR PAIN    allopurinoL (ZYLOPRIM) 100 mg tablet TAKE 1 TABLET DAILY    ASMANEX TWISTHALER 220 mcg/ actuation (30) inhaler USE 1 INHALATION DAILY (DISCARD 45 DAYS AFTER OPENING)    montelukast (SINGULAIR) 10 mg tablet TAKE 1 TABLET DAILY    calcium carbonate/vitamin D3 (CALCIUM+D PO) Take  by mouth daily.  ferrous sulfate (IRON PO) Take  by mouth daily. Self started 1-2019    omeprazole (PRILOSEC) 20 mg capsule TAKE 1 CAPSULE DAILY    multivitamin (ONE A DAY) tablet Take 1 Tab by mouth daily. No current facility-administered medications for this visit. ALLERGIES   No Known Allergies       SOCIAL HISTORY     Social History     Socioeconomic History    Marital status:      Spouse name: Not on file    Number of children: Not on file    Years of education: Not on file    Highest education level: Not on file   Occupational History    Occupation:  for [employer]    Smoking status: Never Smoker    Smokeless tobacco: Never Used   Substance and Sexual Activity    Alcohol use:  Yes     Alcohol/week: 0.8 standard drinks     Types: 1 Glasses of wine per week     Comment: maybe 1 glass of wine/beer a few times a week    Drug use: No    Sexual activity: Yes     Partners: Female     Birth control/protection: Surgical   Other Topics Concern    Caffeine Concern No     Comment: ice tea 2 x a day, diek Coke once a day    Weight Concern No     Comment: pretty stable over the years, personal goal ~195    Special Diet No    Back Care Yes     Comment: 2016-1/2017: Dr Almas Sexotn, [facility], PT     Exercise Yes     Comment: none 3/2020-8/2020 due to Covid; since 8/15/20 cycles on stationary bike 3-4 x a week x 20 miles each session   Social History Narrative    Updated 9-2020: Wife Suhail Casillas is a PAFP of Dr Trudy Almanza. 2 sons who attend [employer] age 15 and 12        IMMUNIZATIONS  Immunization History   Administered Date(s) Administered    Influenza Vaccine (Quad) PF 09/24/2020    Pneumococcal Polysaccharide (PPSV-23) 05/22/2019    TDAP Vaccine 01/04/2011         FAMILY HISTORY     Family History   Problem Relation Age of Onset    Diabetes Father     Cancer Mother 80        bladder cancer removed 1-2018    Breast Cancer Mother         mastectomy 2012    No Known Problems Sister     Prostate Cancer Brother         diagnosed at age 47 in 1-2020    Stroke Maternal Aunt          VITALS     Visit Vitals  /82   Pulse (!) 55   Temp 98.6 °F (37 °C) (Oral)   Resp 20   Ht 6' 1\" (1.854 m)   Wt 205 lb 3.2 oz (93.1 kg)   SpO2 100%   BMI 27.07 kg/m²          PHYSICAL EXAMINATION   Physical Exam  Vitals signs reviewed. Constitutional:       General: He is not in acute distress. Appearance: Normal appearance. HENT:      Right Ear: Tympanic membrane normal.      Left Ear: Tympanic membrane normal.   Eyes:      Conjunctiva/sclera: Conjunctivae normal.   Neck:      Musculoskeletal: Neck supple. Thyroid: No thyroid mass, thyromegaly or thyroid tenderness. Vascular: No carotid bruit. Cardiovascular:      Rate and Rhythm: Normal rate and regular rhythm. Pulses: Normal pulses. Heart sounds: Normal heart sounds. No murmur. No gallop. Pulmonary:      Effort: Pulmonary effort is normal.      Breath sounds: Normal breath sounds. Abdominal:      General: There is no distension. Palpations: Abdomen is soft.       Tenderness: There is no abdominal tenderness. Musculoskeletal: Normal range of motion. General: No swelling or tenderness. Right lower leg: No edema. Left lower leg: No edema. Lymphadenopathy:      Cervical: No cervical adenopathy. Skin:     General: Skin is warm and dry. Neurological:      General: No focal deficit present. Mental Status: He is oriented to person, place, and time. Mental status is at baseline. Psychiatric:         Mood and Affect: Mood normal.             DIAGNOSTIC DATA         LABORATORY DATA     Results for orders placed or performed in visit on 05/22/19   CBC W/O DIFF   Result Value Ref Range    WBC 7.8 3.4 - 10.8 x10E3/uL    RBC 4.80 4. 14 - 5.80 x10E6/uL    HGB 13.6 13.0 - 17.7 g/dL    HCT 40.6 37.5 - 51.0 %    MCV 85 79 - 97 fL    MCH 28.3 26.6 - 33.0 pg    MCHC 33.5 31.5 - 35.7 g/dL    RDW 14.0 12.3 - 15.4 %    PLATELET 917 924 - 112 x10E3/uL   LIPID PANEL   Result Value Ref Range    Cholesterol, total 181 100 - 199 mg/dL    Triglyceride 103 0 - 149 mg/dL    HDL Cholesterol 49 >39 mg/dL    VLDL, calculated 21 5 - 40 mg/dL    LDL, calculated 111 (H) 0 - 99 mg/dL   METABOLIC PANEL, COMPREHENSIVE   Result Value Ref Range    Glucose 83 65 - 99 mg/dL    BUN 16 6 - 24 mg/dL    Creatinine 1.10 0.76 - 1.27 mg/dL    GFR est non-AA 75 >59 mL/min/1.73    GFR est AA 87 >59 mL/min/1.73    BUN/Creatinine ratio 15 9 - 20    Sodium 140 134 - 144 mmol/L    Potassium 4.2 3.5 - 5.2 mmol/L    Chloride 102 96 - 106 mmol/L    CO2 22 20 - 29 mmol/L    Calcium 9.6 8.7 - 10.2 mg/dL    Protein, total 6.8 6.0 - 8.5 g/dL    Albumin 4.6 3.5 - 5.5 g/dL    GLOBULIN, TOTAL 2.2 1.5 - 4.5 g/dL    A-G Ratio 2.1 1.2 - 2.2    Bilirubin, total 0.9 0.0 - 1.2 mg/dL    Alk.  phosphatase 92 39 - 117 IU/L    AST (SGOT) 25 0 - 40 IU/L    ALT (SGPT) 20 0 - 44 IU/L   TSH 3RD GENERATION   Result Value Ref Range    TSH 1.120 0.450 - 4.500 uIU/mL   URIC ACID   Result Value Ref Range    Uric acid 7.5 3.7 - 8.6 mg/dL VITAMIN D, 25 HYDROXY   Result Value Ref Range    VITAMIN D, 25-HYDROXY 33.3 30.0 - 100.0 ng/mL   PSA W/ REFLX FREE PSA   Result Value Ref Range    Prostate Specific Ag 0.4 0.0 - 4.0 ng/mL    Reflex Criteria Comment    CVD REPORT   Result Value Ref Range    INTERPRETATION Note             ASSESSMENT & PLAN       ICD-10-CM ICD-9-CM    1. Routine general medical examination at a health care facility  Z00.00 V70.0 CBC W/O DIFF      LIPID PANEL      METABOLIC PANEL, COMPREHENSIVE      TSH 3RD GENERATION      PSA W/ REFLX FREE PSA   2. Mild hypercholesterolemia  E78.00 272.0 LIPID PANEL   3. Hyperuricemia  E79.0 790.6 URIC ACID   4. Screening PSA (prostate specific antigen)  Z12.5 V76.44 PSA W/ REFLX FREE PSA   5. Needs flu shot  Z23 V04.81 INFLUENZA VIRUS VAC QUAD,SPLIT,PRESV FREE SYRINGE IM     Chronic conditions stable  He brought in his annual health form for work. It is through [employer] given for fasting labs to take to [facility] to have them drawn today  Cardiovascular risk and specific lipid/LDL goals reviewed  Reviewed medications and side effects, doing well on current regimen  Further follow up & other recommendations pending review of labs.  If all remains good and stable, follow up in 1 yr, sooner prn

## 2020-09-25 LAB
ALB/GLOBRATIO, 58C: 2.1 (CALC) (ref 1–2.5)
ALBUMIN SERPL-MCNC: 4.5 G/DL (ref 3.6–5.1)
ALKALINE PHOSPHATASE, TOTAL, 25002000: 99 U/L (ref 35–144)
ALT SERPL-CCNC: 36 U/L (ref 9–46)
AST SERPL W P-5'-P-CCNC: 38 U/L (ref 10–35)
BILIRUB SERPL-MCNC: 0.9 MG/DL (ref 0.2–1.2)
BUN SERPL-MCNC: 20 MG/DL (ref 7–25)
BUN/CREATININE RATIO,BUCR: ABNORMAL (CALC) (ref 6–22)
CALCIUM SERPL-MCNC: 9.5 MG/DL (ref 8.6–10.3)
CHLORIDE SERPL-SCNC: 106 MMOL/L (ref 98–110)
CHOL/HDL RATIO,CHHDX: 3.8 (CALC)
CHOLEST SERPL-MCNC: 197 MG/DL
CO2 SERPL-SCNC: 28 MMOL/L (ref 20–32)
CREAT SERPL-MCNC: 1.08 MG/DL (ref 0.7–1.33)
ERYTHROCYTE [DISTWIDTH] IN BLOOD BY AUTOMATED COUNT: 12.6 % (ref 11–15)
GLOBULIN,GLOB: 2.1 G/DL (CALC) (ref 1.9–3.7)
GLUCOSE SERPL-MCNC: 92 MG/DL (ref 65–99)
HCT VFR BLD AUTO: 43.3 % (ref 38.5–50)
HDLC SERPL-MCNC: 52 MG/DL
HGB BLD-MCNC: 14.5 G/DL (ref 13.2–17.1)
LDL-CHOLESTEROL: 127 MG/DL (CALC)
MCH RBC QN AUTO: 29.3 PG (ref 27–33)
MCHC RBC AUTO-ENTMCNC: 33.5 G/DL (ref 32–36)
MCV RBC AUTO: 87.5 FL (ref 80–100)
NON-HDL CHOLESTEROL, 011976: 145 MG/DL (CALC)
PLATELET # BLD AUTO: 305 THOUSAND/UL (ref 140–400)
PMV BLD AUTO: 10 FL (ref 7.5–12.5)
POTASSIUM SERPL-SCNC: 5 MMOL/L (ref 3.5–5.3)
PROT SERPL-MCNC: 6.6 G/DL (ref 6.1–8.1)
PSA TOTAL,4108: 0.3 NG/ML
RBC # BLD AUTO: 4.95 MILLION/UL (ref 4.2–5.8)
SODIUM SERPL-SCNC: 140 MMOL/L (ref 135–146)
TRIGL SERPL-MCNC: 84 MG/DL (ref ?–150)
TSH SERPL DL<=0.005 MIU/L-ACNC: 1.3 MIU/L (ref 0.4–4.5)
URATE SERPL-MCNC: 7.5 MG/DL (ref 4–8)
WBC # BLD AUTO: 5.5 THOUSAND/UL (ref 3.8–10.8)

## 2020-09-28 ENCOUNTER — DOCUMENTATION ONLY (OUTPATIENT)
Dept: SLEEP MEDICINE | Age: 55
End: 2020-09-28

## 2020-09-28 NOTE — PROGRESS NOTES
Called [facility] spoke with Feng Jason needed for HST by calling [facility] @ [phone] ref # [id number]. Staff message sent to Dr. Marylee Dunn to inform and copied [facility] front staff.

## 2020-11-10 DIAGNOSIS — J45.30 MILD PERSISTENT ASTHMA WITHOUT COMPLICATION: Primary | ICD-10-CM

## 2020-11-10 RX ORDER — MONTELUKAST SODIUM 10 MG/1
TABLET ORAL
Qty: 90 TAB | Refills: 3 | Status: SHIPPED | OUTPATIENT
Start: 2020-11-10 | End: 2021-12-28

## 2020-12-29 DIAGNOSIS — F32.A CHRONIC DEPRESSION: ICD-10-CM

## 2020-12-29 RX ORDER — FLUOXETINE HYDROCHLORIDE 20 MG/1
CAPSULE ORAL
Qty: 90 CAP | Refills: 2 | Status: SHIPPED | OUTPATIENT
Start: 2020-12-29 | End: 2021-11-05

## 2021-01-25 DIAGNOSIS — J45.20 ALLERGIC ASTHMA, MILD INTERMITTENT, UNCOMPLICATED: ICD-10-CM

## 2021-01-25 RX ORDER — MOMETASONE FUROATE 220 UG/1
1 INHALANT RESPIRATORY (INHALATION) DAILY
Qty: 3 EACH | Refills: 2 | Status: SHIPPED | OUTPATIENT
Start: 2021-01-25 | End: 2022-03-30 | Stop reason: ALTCHOICE

## 2021-01-25 NOTE — TELEPHONE ENCOUNTER
PCP: German Hodgson MD    Last appt: 9/24/2020  No future appointments.     Requested Prescriptions     Pending Prescriptions Disp Refills    mometasone (Asmanex Twisthaler) 220 mcg/ actuation (30) inhaler 3 Each 3

## 2021-01-28 ENCOUNTER — TELEPHONE (OUTPATIENT)
Dept: FAMILY MEDICINE CLINIC | Age: 56
End: 2021-01-28

## 2021-01-28 NOTE — TELEPHONE ENCOUNTER
MD Plata,    Patient call stating that when he went to  the asmanex inhaler, it was $562.00 for 90 d/s. Asking if there is a generic or another medication that will work? It is his new HSA plan for insurance.   Thanks, Wing Montero    (I sent patient a message also by [employer] to see if he can check what medication is covered for that class.)

## 2021-02-03 DIAGNOSIS — J45.30 MILD PERSISTENT ASTHMA WITHOUT COMPLICATION: Primary | ICD-10-CM

## 2021-02-03 RX ORDER — ALBUTEROL SULFATE 90 UG/1
2 AEROSOL, METERED RESPIRATORY (INHALATION)
Qty: 1 INHALER | Refills: 1 | Status: SHIPPED | OUTPATIENT
Start: 2021-02-03

## 2021-07-21 ENCOUNTER — OFFICE VISIT (OUTPATIENT)
Dept: FAMILY MEDICINE CLINIC | Age: 56
End: 2021-07-21
Payer: COMMERCIAL

## 2021-07-21 ENCOUNTER — HOSPITAL ENCOUNTER (OUTPATIENT)
Dept: GENERAL RADIOLOGY | Age: 56
Discharge: HOME | End: 2021-07-21
Attending: FAMILY MEDICINE
Payer: COMMERCIAL

## 2021-07-21 VITALS
TEMPERATURE: 97.4 F | HEART RATE: 64 BPM | SYSTOLIC BLOOD PRESSURE: 106 MMHG | HEIGHT: 73 IN | OXYGEN SATURATION: 98 % | WEIGHT: 211.6 LBS | RESPIRATION RATE: 18 BRPM | BODY MASS INDEX: 28.04 KG/M2 | DIASTOLIC BLOOD PRESSURE: 70 MMHG

## 2021-07-21 DIAGNOSIS — M25.541 JOINT PAIN IN BOTH HANDS: ICD-10-CM

## 2021-07-21 DIAGNOSIS — M25.542 JOINT PAIN IN BOTH HANDS: Primary | ICD-10-CM

## 2021-07-21 DIAGNOSIS — E79.0 HYPERURICEMIA: ICD-10-CM

## 2021-07-21 DIAGNOSIS — Z23 ENCOUNTER FOR IMMUNIZATION: ICD-10-CM

## 2021-07-21 DIAGNOSIS — M25.542 JOINT PAIN IN BOTH HANDS: ICD-10-CM

## 2021-07-21 DIAGNOSIS — M25.541 JOINT PAIN IN BOTH HANDS: Primary | ICD-10-CM

## 2021-07-21 LAB
ANION GAP SERPL CALC-SCNC: 4 MMOL/L (ref 5–15)
BUN SERPL-MCNC: 16 MG/DL (ref 6–20)
BUN/CREAT SERPL: 15 (ref 12–20)
CALCIUM SERPL-MCNC: 9.1 MG/DL (ref 8.5–10.1)
CHLORIDE SERPL-SCNC: 107 MMOL/L (ref 97–108)
CO2 SERPL-SCNC: 29 MMOL/L (ref 21–32)
COMMENT, HOLDF: NORMAL
CREAT SERPL-MCNC: 1.04 MG/DL (ref 0.7–1.3)
GLUCOSE SERPL-MCNC: 95 MG/DL (ref 65–100)
POTASSIUM SERPL-SCNC: 4.5 MMOL/L (ref 3.5–5.1)
SAMPLES BEING HELD,HOLD: NORMAL
SODIUM SERPL-SCNC: 140 MMOL/L (ref 136–145)
URATE SERPL-MCNC: 7 MG/DL (ref 3.5–7.2)

## 2021-07-21 PROCEDURE — 90472 IMMUNIZATION ADMIN EACH ADD: CPT | Performed by: FAMILY MEDICINE

## 2021-07-21 PROCEDURE — 90750 HZV VACC RECOMBINANT IM: CPT | Performed by: FAMILY MEDICINE

## 2021-07-21 PROCEDURE — 99214 OFFICE O/P EST MOD 30 MIN: CPT | Performed by: FAMILY MEDICINE

## 2021-07-21 PROCEDURE — 90471 IMMUNIZATION ADMIN: CPT | Performed by: FAMILY MEDICINE

## 2021-07-21 PROCEDURE — 73130 X-RAY EXAM OF HAND: CPT

## 2021-07-21 PROCEDURE — 90715 TDAP VACCINE 7 YRS/> IM: CPT | Performed by: FAMILY MEDICINE

## 2021-07-21 RX ORDER — MINERAL OIL
180 ENEMA (ML) RECTAL DAILY
COMMUNITY

## 2021-07-21 NOTE — PROGRESS NOTES
Identified pt with two pt identifiers(name and ). Reviewed record in preparation for visit and have obtained necessary documentation. Chief Complaint   Patient presents with    Hand Pain     Both hands and knuckles        Vitals:    21 0826   Weight: 211 lb 9.6 oz (96 kg)   Height: 6' 1\" (1.854 m)   PainSc:   2   PainLoc: Hand       Health Maintenance Due   Topic    COVID-19 Vaccine (1)    Shingrix Vaccine Age 50> (1 of 2)    DTaP/Tdap/Td series (2 - Td or Tdap)       Coordination of Care Questionnaire:  :   1) Have you been to an emergency room, urgent care, or hospitalized since your last visit? If yes, where when, and reason for visit? no       2. Have seen or consulted any other health care provider since your last visit? If yes, where when, and reason for visit? NO      Patient is accompanied by self I have received verbal consent from Dion Chavez to discuss any/all medical information while they are present in the room. Dion Chavez is a 60 y.o. male  who presents for Tdap and Shingrix immunizations. he denies any symptoms , reactions or allergies that would exclude them from being immunized today. Risks and adverse reactions were discussed and paper work given him and the VIS was given to them. All questions were addressed. he was observed for 10 min post injection. There were no reactions observed.     Alex Cooley LPN

## 2021-07-21 NOTE — PROGRESS NOTES
Chief Complaint   Patient presents with    Hand Pain     joint pain in fingers of both hands       HISTORY OF PRESENT ILLNESS   HPI  Patient w/ 3 month h/o aching pains, stiffness and tightness in PIP joints of 3rd & 4th fingers of both hands. The left ring finger joint seems to be slightly swollen at times. No redness or effusions. Stiffness worse in AM.  Sometimes hard to  his golf club. Rates the discomfort anywhere from a 1-6/10 depending on what he has done for the day. Has taken Motrin 400 mg on occasion which helps when needed for the worst days. He does have h/o arthritis in his thumb which required surgery years ago. He also has arthritis in other places and used to take Celebrex. He has been off Celebrex x 1 yr due to insurance. He self dc'd Allopurinol 3-4 months ago just because he wanted to cut back on taking some of his medications. He only takes Prilosec prn but has h/o PUD. Denies GI symptoms at present. REVIEW OF SYMPTOMS   Review of Systems   Constitutional: Negative. Negative for chills, fever, malaise/fatigue and weight loss. Respiratory: Negative. Cardiovascular: Negative. Gastrointestinal: Negative. Neurological: Negative. PROBLEM LIST/MEDICAL HISTORY     Problem List  Date Reviewed: 7/21/2021        Codes Class Noted    GERD with stricture ICD-10-CM: K21.9, K22.2  ICD-9-CM: 530.81, 530.3  5/22/2019    Overview Signed 5/22/2019 11:23 AM by Holley Navarro MD     EGD, esophageal dilatation ~2012             ISMAEL (obstructive sleep apnea) ICD-10-CM: Z45.48  ICD-9-CM: 327.23  5/15/2018    Overview Signed 5/15/2018 12:09 PM by Holley Navarro MD     ~9187;  Intolerant of CPAP             Hyperuricemia ICD-10-CM: E79.0  ICD-9-CM: 790.6  5/15/2018        Mild persistent asthma without complication YYH-47-GS: H83.71  ICD-9-CM: 493.90  5/15/2018        Chronic low back pain with left-sided sciatica ICD-10-CM: M54.42, G89.29  ICD-9-CM: 724.2, 724.3, 338.29  5/15/2018    Overview Signed 5/15/2018  1:54 PM by Yesy Lowe MD     MRI 2016: Mild multilevel disc and facet degenerative change focused at L5-S1. Moderate broad-based protrusion at L5-S1 with mild canal stenosis and mild   effacement of nerve is extending to both the right and left S1 foramina.                Protrusion of lumbar intervertebral disc ICD-10-CM: M51.26  ICD-9-CM: 722.10  5/15/2018    Overview Signed 5/15/2018  1:54 PM by Yesy Lowe MD     MRI 2016: Mild multilevel disc and facet degenerative change focused at L5-S1. Moderate broad-based protrusion at L5-S1 with mild canal stenosis and mild   effacement of nerve is extending to both the right and left S1 foramina. DDD (degenerative disc disease), lumbar ICD-10-CM: M51.36  ICD-9-CM: 722.52  5/15/2018        Lumbar radiculitis ICD-10-CM: M54.16  ICD-9-CM: 724.4  6/3/2016        S/P epidural steroid injection ICD-10-CM: Z92.241  ICD-9-CM: V45.89  6/3/2016    Overview Signed 6/3/2016 12:05 PM by Giselle Manzano     Left foraminal with Dr. Juve Soria on 6/1/16             Allergic asthma ICD-10-CM: J45.909  ICD-9-CM: 493.00  2/10/2010        Peptic ulcer disease ICD-10-CM: K27.9  ICD-9-CM: 533.90  2/10/2010        Depression ICD-10-CM: F32.9  ICD-9-CM: 120  2/10/2010        Gout ICD-10-CM: M10.9  ICD-9-CM: 274.9  2/10/2010                  PAST SURGICAL HISTORY     Past Surgical History:   Procedure Laterality Date    HX COLONOSCOPY  12/22/2014    Normal, follow up 10 yrs, Dr Sridevi Dumont HX ENDOSCOPY  2012    esophageal dilatation     HX ORTHOPAEDIC Left 11/12/2018    Left Thumb/CMC Joint repair, Mason Hollins    HX VASECTOMY  12/2013         MEDICATIONS     Current Outpatient Medications   Medication Sig    fexofenadine (ALLEGRA) 180 mg tablet Take 180 mg by mouth daily.     FLUoxetine (PROzac) 20 mg capsule TAKE 1 CAPSULE DAILY    montelukast (SINGULAIR) 10 mg tablet TAKE 1 TABLET DAILY    calcium carbonate/vitamin D3 (CALCIUM+D PO) Take  by mouth daily.  ferrous sulfate (IRON PO) Take  by mouth daily. Self started 1-2019    multivitamin (ONE A DAY) tablet Take 1 Tab by mouth daily.  albuterol (PROVENTIL HFA, VENTOLIN HFA, PROAIR HFA) 90 mcg/actuation inhaler Take 2 Puffs by inhalation every six (6) hours as needed (for asthma symptoms). (Patient not taking: Reported on 7/21/2021)    mometasone (Asmanex Twisthaler) 220 mcg/ actuation (30) inhaler Take 1 Puff by inhalation daily. (Patient not taking: Reported on 7/21/2021)    omeprazole (PRILOSEC) 20 mg capsule TAKE 1 CAPSULE DAILY (Patient not taking: Reported on 7/21/2021)     No current facility-administered medications for this visit. ALLERGIES   No Known Allergies       SOCIAL HISTORY     Social History     Tobacco Use    Smoking status: Never Smoker    Smokeless tobacco: Never Used   Substance Use Topics    Alcohol use: Yes     Alcohol/week: 0.8 standard drinks     Types: 1 Glasses of wine per week     Comment: maybe 1 glass of wine/beer a few times a week     Social History     Social History Narrative    Updated 7-2021:     , 2 sons    Wife Mick Pacheco is a PAFP of Dr Orlando Choudhary.      Both sons attend [employer] 13 and 15 yo     for [employer]    Enjoys golfing     Social History     Substance and Sexual Activity   Sexual Activity Yes    Partners: Female    Birth control/protection: Surgical       IMMUNIZATIONS     Immunization History   Administered Date(s) Administered    COVID-19, PFIZER, MRNA, LNP-S, PF, 30MCG/0.3ML DOSE 03/06/2021, 03/27/2021    Influenza Vaccine [place]) PF (>6 Mo Flulaval, Fluarix, and >3 Yrs Afluria, Fluzone 08598) 09/24/2020    Pneumococcal Polysaccharide (PPSV-23) 05/22/2019    TDAP Vaccine 01/04/2011         FAMILY HISTORY     Family History   Problem Relation Age of Onset    Diabetes Father     Cancer Mother 80        bladder cancer removed 1-2018    Breast Cancer Mother mastectomy 2012    No Known Problems Sister     Prostate Cancer Brother         diagnosed at age 47 in 1-2020    Stroke Maternal Aunt          VITALS     Visit Vitals  /70 (BP 1 Location: Right upper arm, BP Patient Position: Sitting, BP Cuff Size: Large adult)   Pulse 64   Temp 97.4 °F (36.3 °C) (Temporal)   Resp 18   Ht 6' 1\" (1.854 m)   Wt 211 lb 9.6 oz (96 kg)   SpO2 98%   BMI 27.92 kg/m²          PHYSICAL EXAMINATION   Physical Exam  Vitals reviewed. Constitutional:       General: He is not in acute distress. Appearance: Normal appearance. He is not ill-appearing. Cardiovascular:      Rate and Rhythm: Normal rate and regular rhythm. Pulmonary:      Effort: Pulmonary effort is normal.   Musculoskeletal:      Right hand: No tenderness or bony tenderness. Normal range of motion. Normal strength. Left hand: No tenderness or bony tenderness. Normal range of motion. Normal strength. Comments: Mild PIP joint enlargement left 4th finger. In general there are no joint effusions, warmth or erythema. ROM and  intact. Neurological:      Mental Status: He is alert.                 LABORATORY DATA/ANCILLARY/IMAGING     Results for orders placed or performed in visit on 09/24/20   LIPID PANEL   Result Value Ref Range    Cholesterol, total 197 <200 mg/dL    HDL Cholesterol 52 > OR = 40 mg/dL    Triglyceride 84 <150 mg/dL    LDL-CHOLESTEROL 127 (H) mg/dL (calc)    Cholesterol/HDL ratio 3.8 <5.0 (calc)    Non-HDL Cholesterol 145 (H) <130 mg/dL (calc)   METABOLIC PANEL, COMPREHENSIVE   Result Value Ref Range    Glucose 92 65 - 99 mg/dL    BUN 20 7 - 25 mg/dL    Creatinine 1.08 0.70 - 1.33 mg/dL    GFR est non-AA 76 > OR = 60 mL/min/1.73m2    GFR est AA 88 > OR = 60 mL/min/1.73m2    BUN/Creatinine ratio NOT APPLICABLE 6 - 22 (calc)    Sodium 140 135 - 146 mmol/L    Potassium 5.0 3.5 - 5.3 mmol/L    Chloride 106 98 - 110 mmol/L    CO2 28 20 - 32 mmol/L    Calcium 9.5 8.6 - 10.3 mg/dL    Protein, total 6.6 6.1 - 8.1 g/dL    Albumin 4.5 3.6 - 5.1 g/dL    Globulin 2.1 1.9 - 3.7 g/dL (calc)    ALB/GLOBRATIO 2.1 1.0 - 2.5 (calc)    Bilirubin, total 0.9 0.2 - 1.2 mg/dL    Alkaline Phosphatase, total 99 35 - 144 U/L    AST (SGOT) 38 (H) 10 - 35 U/L    ALT (SGPT) 36 9 - 46 U/L   URIC ACID   Result Value Ref Range    Uric acid 7.5 4.0 - 8.0 mg/dL   TSH 3RD GENERATION   Result Value Ref Range    TSH 1.30 0.40 - 4.50 mIU/L   CBC W/O DIFF   Result Value Ref Range    WBC 5.5 3.8 - 10.8 Thousand/uL    RBC 4.95 4.20 - 5.80 Million/uL    HGB 14.5 13.2 - 17.1 g/dL    HCT 43.3 38.5 - 50.0 %    MCV 87.5 80.0 - 100.0 fL    MCH 29.3 27.0 - 33.0 pg    MCHC 33.5 32.0 - 36.0 g/dL    RDW 12.6 11.0 - 15.0 %    PLATELET 337 152 - 774 Thousand/uL    MEAN PLATELET VOLUME 04.2 7.5 - 12.5 fL   PSA W/ REFLX FREE PSA   Result Value Ref Range    PSA Total 0.3 < OR = 4.0 ng/mL            ASSESSMENT & PLAN       ICD-10-CM ICD-9-CM    1. Joint pain in both hands  M25.541 719.44 RHEUMATOID FACTOR, QL    M25.542  XR HAND LT MIN 3 V      XR HAND RT MIN 3 V   2. Hyperuricemia  M06.7 346.9 METABOLIC PANEL, BASIC      URIC ACID   3. Encounter for immunization  Z23 V03.89 ZOSTER VACC RECOMBINANT ADJUVANTED      TETANUS, DIPHTHERIA TOXOIDS AND ACELLULAR PERTUSSIS VACCINE (TDAP), IN INDIVIDS. >=7, IM     Check plain films and basic labs ordered above  He does have h/o arthritis in his thumb which required surgery years ago. He also has arthritis in other places and used to take Celebrex. He has been off Celebrex x 1 yr due to insurance. He self dc'd Allopurinol 3-4 months ago just because he wanted to cut back on taking some of his medications. He only takes Prilosec prn but has h/o PUD. Denies GI symptoms at present. Reviewed medications, effects, risks/benefits, precautions and potential side effects.    Further follow up & other recommendations pending review of labs and xray  Hand arthritis exercises included in AVS.  Of note he reports having a CPE and fasting labs at work for his annual wellness and was able to get points for work.

## 2021-07-21 NOTE — PATIENT INSTRUCTIONS
Hand Arthritis: Exercises  Introduction  Here are some examples of exercises for you to try. The exercises may be suggested for a condition or for rehabilitation. Start each exercise slowly. Ease off the exercises if you start to have pain. You will be told when to start these exercises and which ones will work best for you. How to do the exercises  Tendon glides   1. In this exercise, the steps follow one another to a make a continuous movement. 2. With your affected hand, point your fingers and thumb straight up. Your wrist should be relaxed, following the line of your fingers and thumb. 3. Curl your fingers so that the top two joints in them are bent, and your fingers wrap down. Your fingertips should touch or be near the base of your fingers. Your fingers will look like a hook. 4. Make a fist by bending your knuckles. Your thumb can gently rest against your index (pointing) finger. 5. Unwind your fingers slightly so that your fingertips can touch the base of your palm. Your thumb can rest against your index finger. 6. Move back to your starting position, with your fingers and thumb pointing up. 7. Repeat the series of motions 8 to 12 times. 8. Switch hands and repeat steps 1 through 6, even if only one hand is sore. Intrinsic flexion   1. Rest your affected hand on a table and bend the large joints where your fingers connect to your hand. Keep your thumb and the other joints in your fingers straight. 2. Slowly straighten your fingers. Your wrist should be relaxed, following the line of your fingers and thumb. 3. Move back to your starting position, with your hand bent. 4. Repeat 8 to 12 times. 5. Switch hands and repeat steps 1 through 4, even if only one hand is sore. Finger extension   1. Place your affected hand flat on a table. 2. Lift and then lower one finger at a time off the table. 3. Repeat 8 to 12 times.   4. Switch hands and repeat steps 1 through 3, even if only one hand is sore.    MP extension   1. Place your good hand on a table, palm up. Put your affected hand on top of your good hand with your fingers wrapped around the thumb of your good hand like you are making a fist.  2. Slowly uncurl the joints of your affected hand where your fingers connect to your hand so that only the top two joints of your fingers are bent. Your fingers will look like a hook. 3. Move back to your starting position, with your fingers wrapped around your good thumb. 4. Repeat 8 to 12 times. 5. Switch hands and repeat steps 1 through 4, even if only one hand is sore. PIP extension (with MP extension)   1. Place your good hand on a table, palm up. Put your affected hand on top of your good hand, palm up. 2. Use the thumb and fingers of your good hand to grasp below the middle joint of one finger of your affected hand. 3. Straighten the last two joints of that finger. 4. Repeat 8 to 12 times. 5. Repeat steps 1 through 4 with each finger. 6. Switch hands and repeat steps 1 through 5, even if only one hand is sore. DIP flexion   1. With your good hand, grasp one finger of your affected hand. Your thumb will be on the top side of your finger just below the joint that is closest to your fingernail. 2. Slowly bend your affected finger only at the joint closest to your fingernail. 3. Repeat 8 to 12 times. 4. Repeat steps 1 through 3 with each finger. 5. Switch hands and repeat steps 1 through 4, even if only one hand is sore. Follow-up care is a key part of your treatment and safety. Be sure to make and go to all appointments, and call your doctor if you are having problems. It's also a good idea to know your test results and keep a list of the medicines you take. Where can you learn more? Go to http://www.gray.com/  Enter E040 in the search box to learn more about \"Hand Arthritis: Exercises. \"  Current as of: November 16, 2020               Content Version: 12.8  © 5938-6398 [employer]wise, Inc[employer]. Care instructions adapted under license by [employer] (which disclaims liability or warranty for this information). If you have questions about a medical condition or this instruction, always ask your healthcare professional. [place] [address] any warranty or liability for your use of this information.

## 2021-07-22 LAB — RHEUMATOID FACT SERPL-ACNC: <10 IU/ML (ref 0–13.9)

## 2021-08-02 DIAGNOSIS — E79.0 HYPERURICEMIA: Primary | ICD-10-CM

## 2021-08-02 DIAGNOSIS — K27.9 PEPTIC ULCER DISEASE: ICD-10-CM

## 2021-08-02 RX ORDER — ALLOPURINOL 100 MG/1
TABLET ORAL
Qty: 90 TABLET | Refills: 2 | Status: SHIPPED | OUTPATIENT
Start: 2021-08-02 | End: 2022-06-24

## 2021-08-02 RX ORDER — OMEPRAZOLE 20 MG/1
CAPSULE, DELAYED RELEASE ORAL
Qty: 90 CAPSULE | Refills: 2 | Status: SHIPPED | OUTPATIENT
Start: 2021-08-02 | End: 2022-06-24

## 2021-08-02 NOTE — TELEPHONE ENCOUNTER
LOV 7/21/21    ----- Message from Yashira Grant sent at 8/2/2021  8:02 AM EDT -----  Regarding: Prescription Question  Contact: [phone]  Thanks for the updates. Can you order a new prescription of Allopurinol and the prescription for Prilosec from [facility] for me? They are not showing up as available for refill since I stopped them awhile ago. Thank You.

## 2021-11-04 DIAGNOSIS — F32.A CHRONIC DEPRESSION: ICD-10-CM

## 2021-11-05 RX ORDER — FLUOXETINE HYDROCHLORIDE 20 MG/1
CAPSULE ORAL
Qty: 90 CAPSULE | Refills: 1 | Status: SHIPPED | OUTPATIENT
Start: 2021-11-05 | End: 2022-06-24

## 2022-03-18 PROBLEM — E79.0 HYPERURICEMIA: Status: ACTIVE | Noted: 2018-05-15

## 2022-03-18 PROBLEM — K22.2 GERD WITH STRICTURE: Status: ACTIVE | Noted: 2019-05-22

## 2022-03-18 PROBLEM — K21.9 GERD WITH STRICTURE: Status: ACTIVE | Noted: 2019-05-22

## 2022-03-18 PROBLEM — M51.26 PROTRUSION OF LUMBAR INTERVERTEBRAL DISC: Status: ACTIVE | Noted: 2018-05-15

## 2022-03-18 PROBLEM — J45.30 MILD PERSISTENT ASTHMA WITHOUT COMPLICATION: Status: ACTIVE | Noted: 2018-05-15

## 2022-03-19 PROBLEM — G47.33 OSA (OBSTRUCTIVE SLEEP APNEA): Status: ACTIVE | Noted: 2018-05-15

## 2022-03-19 PROBLEM — M51.369 DDD (DEGENERATIVE DISC DISEASE), LUMBAR: Status: ACTIVE | Noted: 2018-05-15

## 2022-03-19 PROBLEM — M51.36 DDD (DEGENERATIVE DISC DISEASE), LUMBAR: Status: ACTIVE | Noted: 2018-05-15

## 2022-03-20 PROBLEM — M54.42 CHRONIC LOW BACK PAIN WITH LEFT-SIDED SCIATICA: Status: ACTIVE | Noted: 2018-05-15

## 2022-03-20 PROBLEM — G89.29 CHRONIC LOW BACK PAIN WITH LEFT-SIDED SCIATICA: Status: ACTIVE | Noted: 2018-05-15

## 2022-03-21 ENCOUNTER — TELEPHONE (OUTPATIENT)
Dept: FAMILY MEDICINE CLINIC | Age: 57
End: 2022-03-21

## 2022-03-21 DIAGNOSIS — Z12.5 SCREENING PSA (PROSTATE SPECIFIC ANTIGEN): ICD-10-CM

## 2022-03-21 DIAGNOSIS — E79.0 HYPERURICEMIA: ICD-10-CM

## 2022-03-21 DIAGNOSIS — Z00.00 ROUTINE GENERAL MEDICAL EXAMINATION AT A HEALTH CARE FACILITY: Primary | ICD-10-CM

## 2022-03-21 NOTE — TELEPHONE ENCOUNTER
----- Message from Brandon Lowry sent at 3/21/2022  8:46 AM EDT -----  Subject: Message to Provider    QUESTIONS  Information for Provider? Pt would like to order full labs for blood work   to be done prior to 3/30/22 appt. Can you please assist?  ---------------------------------------------------------------------------  --------------  CALL BACK INFO  What is the best way for the office to contact you? OK to leave message on   voicemail  Preferred Call Back Phone Number? [phone]  ---------------------------------------------------------------------------  --------------  SCRIPT ANSWERS  Relationship to Patient?  Self

## 2022-03-22 NOTE — TELEPHONE ENCOUNTER
LVM for pt that I scheduled him for Fri 3/25 @ 8:45 for fasting lab appt. I also sent same message thru mychart.

## 2022-03-25 ENCOUNTER — LAB ONLY (OUTPATIENT)
Dept: FAMILY MEDICINE CLINIC | Age: 57
End: 2022-03-25

## 2022-03-25 DIAGNOSIS — Z12.5 SCREENING PSA (PROSTATE SPECIFIC ANTIGEN): ICD-10-CM

## 2022-03-25 DIAGNOSIS — E79.0 HYPERURICEMIA: ICD-10-CM

## 2022-03-25 DIAGNOSIS — Z00.00 ROUTINE GENERAL MEDICAL EXAMINATION AT A HEALTH CARE FACILITY: ICD-10-CM

## 2022-03-25 LAB
ALBUMIN SERPL-MCNC: 4.1 G/DL (ref 3.5–5)
ALBUMIN/GLOB SERPL: 1.4 {RATIO} (ref 1.1–2.2)
ALP SERPL-CCNC: 112 U/L (ref 45–117)
ALT SERPL-CCNC: 40 U/L (ref 12–78)
ANION GAP SERPL CALC-SCNC: 2 MMOL/L (ref 5–15)
AST SERPL-CCNC: 26 U/L (ref 15–37)
BILIRUB SERPL-MCNC: 0.9 MG/DL (ref 0.2–1)
BUN SERPL-MCNC: 16 MG/DL (ref 6–20)
BUN/CREAT SERPL: 13 (ref 12–20)
CALCIUM SERPL-MCNC: 9.2 MG/DL (ref 8.5–10.1)
CHLORIDE SERPL-SCNC: 108 MMOL/L (ref 97–108)
CHOLEST SERPL-MCNC: 185 MG/DL
CO2 SERPL-SCNC: 28 MMOL/L (ref 21–32)
CREAT SERPL-MCNC: 1.19 MG/DL (ref 0.7–1.3)
ERYTHROCYTE [DISTWIDTH] IN BLOOD BY AUTOMATED COUNT: 13.4 % (ref 11.5–14.5)
GLOBULIN SER CALC-MCNC: 2.9 G/DL (ref 2–4)
GLUCOSE SERPL-MCNC: 93 MG/DL (ref 65–100)
HCT VFR BLD AUTO: 45 % (ref 36.6–50.3)
HDLC SERPL-MCNC: 52 MG/DL
HDLC SERPL: 3.6 {RATIO} (ref 0–5)
HGB BLD-MCNC: 14.5 G/DL (ref 12.1–17)
LDLC SERPL CALC-MCNC: 119.8 MG/DL (ref 0–100)
MCH RBC QN AUTO: 28.9 PG (ref 26–34)
MCHC RBC AUTO-ENTMCNC: 32.2 G/DL (ref 30–36.5)
MCV RBC AUTO: 89.8 FL (ref 80–99)
NRBC # BLD: 0 K/UL (ref 0–0.01)
NRBC BLD-RTO: 0 PER 100 WBC
PLATELET # BLD AUTO: 320 K/UL (ref 150–400)
PMV BLD AUTO: 9.9 FL (ref 8.9–12.9)
POTASSIUM SERPL-SCNC: 4.8 MMOL/L (ref 3.5–5.1)
PROT SERPL-MCNC: 7 G/DL (ref 6.4–8.2)
RBC # BLD AUTO: 5.01 M/UL (ref 4.1–5.7)
SODIUM SERPL-SCNC: 138 MMOL/L (ref 136–145)
TRIGL SERPL-MCNC: 66 MG/DL (ref ?–150)
TSH SERPL DL<=0.05 MIU/L-ACNC: 1.34 UIU/ML (ref 0.36–3.74)
URATE SERPL-MCNC: 6.7 MG/DL (ref 3.5–7.2)
VLDLC SERPL CALC-MCNC: 13.2 MG/DL
WBC # BLD AUTO: 5 K/UL (ref 4.1–11.1)

## 2022-03-26 LAB
PSA SERPL-MCNC: 0.4 NG/ML (ref 0–4)
REFLEX CRITERIA: NORMAL

## 2022-03-27 NOTE — PROGRESS NOTES
Labs pre-ordered for review at upcoming appointment with PCP:  Future Appointments  Date Time Provider Francesco Garnica  3/30/2022  2:30 PM Angel Plata MD PAFP [facility]

## 2022-03-30 ENCOUNTER — OFFICE VISIT (OUTPATIENT)
Dept: FAMILY MEDICINE CLINIC | Age: 57
End: 2022-03-30
Payer: COMMERCIAL

## 2022-03-30 VITALS
HEART RATE: 67 BPM | TEMPERATURE: 98 F | WEIGHT: 205 LBS | RESPIRATION RATE: 16 BRPM | BODY MASS INDEX: 27.17 KG/M2 | OXYGEN SATURATION: 99 % | HEIGHT: 73 IN | DIASTOLIC BLOOD PRESSURE: 78 MMHG | SYSTOLIC BLOOD PRESSURE: 124 MMHG

## 2022-03-30 DIAGNOSIS — Z23 ENCOUNTER FOR IMMUNIZATION: ICD-10-CM

## 2022-03-30 DIAGNOSIS — Z00.00 ROUTINE GENERAL MEDICAL EXAMINATION AT A HEALTH CARE FACILITY: Primary | ICD-10-CM

## 2022-03-30 DIAGNOSIS — Z12.5 SCREENING PSA (PROSTATE SPECIFIC ANTIGEN): ICD-10-CM

## 2022-03-30 PROCEDURE — 99396 PREV VISIT EST AGE 40-64: CPT | Performed by: FAMILY MEDICINE

## 2022-03-30 PROCEDURE — 90471 IMMUNIZATION ADMIN: CPT | Performed by: FAMILY MEDICINE

## 2022-03-30 PROCEDURE — 90750 HZV VACC RECOMBINANT IM: CPT | Performed by: FAMILY MEDICINE

## 2022-03-30 NOTE — PROGRESS NOTES
Chief Complaint   Patient presents with    Complete Physical     had labs last week     1. \"Have you been to the ER, urgent care clinic since your last visit? Hospitalized since your last visit? \" No    2. \"Have you seen or consulted any other health care providers outside of the [address] [place] [address] since your last visit? \"      3. For patients aged 39-70: Has the patient had a colonoscopy / FIT/ Cologuard? Colonoscopy 12/2014 Dr Marianna Hidalgo      If the patient is female:    4. For patients aged 41-77: Has the patient had a mammogram within the past 2 years? NA - based on age or sex      11. For patients aged 21-65: Has the patient had a pap smear?  NA - based on age or sex

## 2022-03-30 NOTE — PROGRESS NOTES
Chief Complaint   Patient presents with    Complete Physical     Fasting labs done 3/25/22       HISTORY OF PRESENT ILLNESS   HPI  Annual CPE  Fasting labs done 3/25/22, here to review and complete biometrics form  Diet: nothing special   Caffeine: glass of ice tea qam, Diet Coke at lunch, glass of tea w/ dinner; no coffee  Weight: 200-210 range over the years  Exercise: gym at work 3-4 x a week: elliptical 15-25 minutes, weights x 1 hr  Also enjoys golfing but has had to put that on hold due to recurrent left shoulder pain and he keeps straining his left thoracic region  Whenever he rests the areas and takes a break from aggravating activities things get better  He will hold off on those activities until ortho evaluation  He has been followed at [facility] and will consult there w/ sports medicine   REVIEW OF SYMPTOMS   Review of Systems   Constitutional: Negative. HENT: Negative. Eyes: Negative. Respiratory: Negative. Asthma stable x years on maintenance therapy. He continues on Singulair daily w/ good control and has not been on maintenance Asmanex for a while now. Seldom needs his rescue inhaler   Cardiovascular: Negative. Gastrointestinal: Negative. Takes Prilosec daily for h/o gerd w/ stricture. In past when trying to wean off, he gets bad gerd/burning/chest pain and increased swallowing difficulty. Symptoms remain stable on maintenance Prilosec     Genitourinary: Negative. Neurological: Negative. Endo/Heme/Allergies: Negative. Psychiatric/Behavioral: Negative.          Mood remains good and stable on regimen of Prozac daily           PROBLEM LIST/MEDICAL HISTORY     Problem List  Date Reviewed: 3/30/2022          Codes Class Noted    GERD with stricture ICD-10-CM: K21.9, K22.2  ICD-9-CM: 530.81, 530.3  5/22/2019    Overview Signed 5/22/2019 11:23 AM by Rustam Lowery MD     EGD, esophageal dilatation ~2012             ISMAEL (obstructive sleep apnea) ICD-10-CM: G47.33  ICD-9-CM: 327.23  5/15/2018    Overview Signed 5/15/2018 12:09 PM by Celestina Vaughan MD     ~7707; Intolerant of CPAP             Hyperuricemia ICD-10-CM: E79.0  ICD-9-CM: 790.6  5/15/2018        Mild persistent asthma without complication LLY-46-LW: Y22.05  ICD-9-CM: 493.90  5/15/2018        Chronic low back pain with left-sided sciatica ICD-10-CM: M54.42, G89.29  ICD-9-CM: 724.2, 724.3, 338.29  5/15/2018    Overview Signed 5/15/2018  1:54 PM by Celestina Vaughan MD     MRI 2016: Mild multilevel disc and facet degenerative change focused at L5-S1. Moderate broad-based protrusion at L5-S1 with mild canal stenosis and mild   effacement of nerve is extending to both the right and left S1 foramina.                Protrusion of lumbar intervertebral disc ICD-10-CM: M51.26  ICD-9-CM: 722.10  5/15/2018    Overview Signed 5/15/2018  1:54 PM by Celestina Vaughan MD     MRI 2016: Mild multilevel disc and facet degenerative change focused at L5-S1. Moderate broad-based protrusion at L5-S1 with mild canal stenosis and mild   effacement of nerve is extending to both the right and left S1 foramina. DDD (degenerative disc disease), lumbar ICD-10-CM: M51.36  ICD-9-CM: 722.52  5/15/2018        Lumbar radiculitis ICD-10-CM: M54.16  ICD-9-CM: 724.4  6/3/2016        S/P epidural steroid injection ICD-10-CM: Z92.241  ICD-9-CM: V45.89  6/3/2016    Overview Signed 6/3/2016 12:05 PM by Aravind iNeto     Left foraminal with Dr. Weston Manual on 6/1/16             Allergic asthma ICD-10-CM: J45.909  ICD-9-CM: 493.00  2/10/2010        Peptic ulcer disease ICD-10-CM: K27.9  ICD-9-CM: 533.90  2/10/2010        Depression ICD-10-CM: F32. A  ICD-9-CM: 467  2/10/2010        Gout ICD-10-CM: M10.9  ICD-9-CM: 274.9  2/10/2010                  PAST SURGICAL HISTORY     Past Surgical History:   Procedure Laterality Date    HX COLONOSCOPY  12/22/2014    Normal, follow up 10 yrs, Dr Ziggy Hernandez  2012 esophageal dilatation     HX ORTHOPAEDIC Left 11/12/2018    Left Thumb/CMC Joint repair, Dr Esdras Ross, [facility]    HX VASECTOMY  12/2013         MEDICATIONS     Current Outpatient Medications   Medication Sig    montelukast (SINGULAIR) 10 mg tablet TAKE 1 TABLET DAILY    FLUoxetine (PROzac) 20 mg capsule TAKE 1 CAPSULE DAILY    allopurinoL (ZYLOPRIM) 100 mg tablet TAKE 1 TABLET DAILY    omeprazole (PRILOSEC) 20 mg capsule TAKE 1 CAPSULE DAILY    fexofenadine (ALLEGRA) 180 mg tablet Take 180 mg by mouth daily.  albuterol (PROVENTIL HFA, VENTOLIN HFA, PROAIR HFA) 90 mcg/actuation inhaler Take 2 Puffs by inhalation every six (6) hours as needed (for asthma symptoms). (Patient not taking: Reported on 7/21/2021)     No current facility-administered medications for this visit. ALLERGIES   No Known Allergies       SOCIAL HISTORY     Social History     Tobacco Use    Smoking status: Never Smoker    Smokeless tobacco: Never Used   Substance Use Topics    Alcohol use: Yes     Alcohol/week: 0.8 standard drinks     Types: 1 Glasses of wine per week     Comment: 2-3 bourbons or beer on weekends only     Social History     Social History Narrative    , 2 sons    Wife Elena Cerrato is a PAFP of Dr Juanis Nascimento.      Both sons attend [employer] 12 and 26 yo     for [employer]    Enjoys [employer]        Diet: nothing special     Caffeine: glass of ice tea qam, Diet Coke at lunch, glass of tea w/ dinner; no coffee    Exercise: gym at work 3-4 x a week: elliptical 15-25 minutes, weights x 1 hr    Weight: 200-210 range over the years         Social History     Substance and Sexual Activity   Sexual Activity Yes    Partners: Female    Birth control/protection: Surgical    Comment: Vasectomy 2013       IMMUNIZATIONS     Immunization History   Administered Date(s) Administered    COVID-19, Pfizer Purple top, DILUTE for use, 12+ yrs, 30mcg/0.3mL dose 03/06/2021, 03/27/2021, 11/22/2021    Influenza Vaccine (Quad) PF (>6 Mo Flulaval, Fluarix, and >3 Yrs Afluria, Fluzone 71072) 2020    Pneumococcal Polysaccharide (PPSV-23) 2019    TDAP Vaccine 2011    Tdap 2021    Zoster Recombinant 2021, 2022         FAMILY HISTORY     Family History   Problem Relation Age of Onset    Diabetes Father         Type 2    Cancer Mother 80        bladder cancer removed     Breast Cancer Mother         mastectomy     Dementia Mother           age 80    No Known Problems Sister     Prostate Cancer Brother         diagnosed at age 47 in     Stroke Maternal Aunt          VITALS     Visit Vitals  /78 (BP 1 Location: Left upper arm, BP Patient Position: Sitting, BP Cuff Size: Large adult)   Pulse 67   Temp 98 °F (36.7 °C) (Oral)   Resp 16   Ht 6' 1\" (1.854 m)   Wt 205 lb (93 kg)   SpO2 99%   BMI 27.05 kg/m²          PHYSICAL EXAMINATION   Physical Exam  Vitals reviewed. Constitutional:       General: He is not in acute distress. Appearance: Normal appearance. HENT:      Right Ear: Tympanic membrane normal.      Left Ear: Tympanic membrane normal.   Eyes:      Conjunctiva/sclera: Conjunctivae normal.   Neck:      Thyroid: No thyroid mass, thyromegaly or thyroid tenderness. Vascular: No carotid bruit. Cardiovascular:      Rate and Rhythm: Normal rate and regular rhythm. Heart sounds: Normal heart sounds. Pulmonary:      Effort: Pulmonary effort is normal.      Breath sounds: Normal breath sounds. Abdominal:      General: There is no distension. Palpations: Abdomen is soft. There is no mass. Tenderness: There is no abdominal tenderness. Musculoskeletal:         General: Normal range of motion. Cervical back: Neck supple. Right lower leg: No edema. Left lower leg: No edema. Lymphadenopathy:      Cervical: No cervical adenopathy. Skin:     General: Skin is warm and dry. Neurological:      General: No focal deficit present. Mental Status: He is alert. Cranial Nerves: No cranial nerve deficit. Psychiatric:         Mood and Affect: Mood normal.                LABORATORY DATA/ANCILLARY/IMAGING     Results for orders placed or performed in visit on 03/25/22   URIC ACID   Result Value Ref Range    Uric acid 6.7 3.5 - 7.2 MG/DL   PSA W/ REFLX FREE PSA   Result Value Ref Range    Prostate Specific Ag 0.4 0.0 - 4.0 ng/mL    Reflex Criteria Comment     TSH 3RD GENERATION   Result Value Ref Range    TSH 1.34 0.36 - 3.74 uIU/mL   LIPID PANEL   Result Value Ref Range    Cholesterol, total 185 <200 MG/DL    Triglyceride 66 <150 MG/DL    HDL Cholesterol 52 MG/DL    LDL, calculated 119.8 (H) 0 - 100 MG/DL    VLDL, calculated 13.2 MG/DL    CHOL/HDL Ratio 3.6 0.0 - 5.0     METABOLIC PANEL, COMPREHENSIVE   Result Value Ref Range    Sodium 138 136 - 145 mmol/L    Potassium 4.8 3.5 - 5.1 mmol/L    Chloride 108 97 - 108 mmol/L    CO2 28 21 - 32 mmol/L    Anion gap 2 (L) 5 - 15 mmol/L    Glucose 93 65 - 100 mg/dL    BUN 16 6 - 20 MG/DL    Creatinine 1.19 0.70 - 1.30 MG/DL    BUN/Creatinine ratio 13 12 - 20      GFR est AA >60 >60 ml/min/1.73m2    GFR est non-AA >60 >60 ml/min/1.73m2    Calcium 9.2 8.5 - 10.1 MG/DL    Bilirubin, total 0.9 0.2 - 1.0 MG/DL    ALT (SGPT) 40 12 - 78 U/L    AST (SGOT) 26 15 - 37 U/L    Alk. phosphatase 112 45 - 117 U/L    Protein, total 7.0 6.4 - 8.2 g/dL    Albumin 4.1 3.5 - 5.0 g/dL    Globulin 2.9 2.0 - 4.0 g/dL    A-G Ratio 1.4 1.1 - 2.2     CBC W/O DIFF   Result Value Ref Range    WBC 5.0 4.1 - 11.1 K/uL    RBC 5.01 4.10 - 5.70 M/uL    HGB 14.5 12.1 - 17.0 g/dL    HCT 45.0 36.6 - 50.3 %    MCV 89.8 80.0 - 99.0 FL    MCH 28.9 26.0 - 34.0 PG    MCHC 32.2 30.0 - 36.5 g/dL    RDW 13.4 11.5 - 14.5 %    PLATELET 651 494 - 219 K/uL    MPV 9.9 8.9 - 12.9 FL    NRBC 0.0 0  WBC    ABSOLUTE NRBC 0.00 0.00 - 0.01 K/uL             ASSESSMENT & PLAN       ICD-10-CM ICD-9-CM    1.  Routine general medical examination at a health care facility  Z00.00 V70.0    2. Screening PSA (prostate specific antigen)  Z12.5 V76.44    3. Encounter for immunization  Z23 V03.89 ZOSTER VACC RECOMBINANT ADJUVANTED      VT IMMUNIZ ADMIN,1 SINGLE/COMB VAC/TOXOID     Fasting labs from 3/25/22, cardiovascular risk and specific lipid/LDL goals reviewed  Biometrics form completed and faxed, copy given to patient  Reviewed diet, nutrition, exercise, weight management, BMI/goals. Age/risk based screening recommendations, health maintenance & prevention counseling. Cancer screening USPTFS guidelines reviewed w/ pt today. Discussed benefits/positive/negative outcomes of screening based on age/risk stratification. Informed consent for/against screening based on pt's personal hx/risk factors. Updated in history above and health maintenance. Reviewed medications and side effects. Doing well on current regimen. No changes at this time.   RTC 1 yr for annual fasting check or return sooner in the interim prn

## 2022-04-22 ENCOUNTER — VIRTUAL VISIT (OUTPATIENT)
Dept: FAMILY MEDICINE CLINIC | Age: 57
End: 2022-04-22
Payer: COMMERCIAL

## 2022-04-22 DIAGNOSIS — J32.9 SINUSITIS, UNSPECIFIED CHRONICITY, UNSPECIFIED LOCATION: Primary | ICD-10-CM

## 2022-04-22 PROCEDURE — 99213 OFFICE O/P EST LOW 20 MIN: CPT | Performed by: FAMILY MEDICINE

## 2022-04-22 RX ORDER — AMOXICILLIN AND CLAVULANATE POTASSIUM 875; 125 MG/1; MG/1
1 TABLET, FILM COATED ORAL EVERY 12 HOURS
Qty: 10 TABLET | Refills: 0 | Status: SHIPPED | OUTPATIENT
Start: 2022-04-22 | End: 2022-04-27

## 2022-04-22 NOTE — PROGRESS NOTES
Jing Berger, was evaluated through a synchronous (real-time) audio-video encounter. The patient (or guardian if applicable) is aware that this is a billable service, which includes applicable co-pays. This Virtual Visit was conducted with patient's (and/or legal guardian's) consent. The visit was conducted pursuant to the emergency declaration under the [address] [place] [address] and the [employer] General Act. Patient identification was verified, and a caregiver was present when appropriate. The patient was located in a state where the provider was licensed to provide care. Charla Ledesma MD    [phone]  Subjective:   Jing Berger is a 60 y.o. M who was seen for:    Developed URI symptoms of nasal congestion, sore throat, runny nose, sneezing, green nasal discharge, and fatigue. His son was sick with the similar symptoms last week who teste negative for COVID and flu; improved with abx. Hx of asthma but well controlled. Hx of seasonal allergies, on Allegra and Singulair. Current Outpatient Medications:     montelukast (SINGULAIR) 10 mg tablet, TAKE 1 TABLET DAILY, Disp: 90 Tablet, Rfl: 0    FLUoxetine (PROzac) 20 mg capsule, TAKE 1 CAPSULE DAILY, Disp: 90 Capsule, Rfl: 1    allopurinoL (ZYLOPRIM) 100 mg tablet, TAKE 1 TABLET DAILY, Disp: 90 Tablet, Rfl: 2    omeprazole (PRILOSEC) 20 mg capsule, TAKE 1 CAPSULE DAILY, Disp: 90 Capsule, Rfl: 2    fexofenadine (ALLEGRA) 180 mg tablet, Take 180 mg by mouth daily. , Disp: , Rfl:     albuterol (PROVENTIL HFA, VENTOLIN HFA, PROAIR HFA) 90 mcg/actuation inhaler, Take 2 Puffs by inhalation every six (6) hours as needed (for asthma symptoms). (Patient not taking: Reported on 7/21/2021), Disp: 1 Inhaler, Rfl: 1    No Known Allergies    Review of Systems   Constitutional: Positive for weight loss. Negative for fever and malaise/fatigue.    HENT: Positive for congestion, sinus pain and sore throat. Respiratory: Positive for cough. Negative for hemoptysis, shortness of breath and wheezing. Cardiovascular: Negative for chest pain, palpitations, leg swelling and PND. Gastrointestinal: Negative for abdominal pain, constipation, diarrhea, nausea and vomiting. Objective:     Patient-Reported Vitals 8/11/2020   Patient-Reported Weight 203 lbs   Patient-Reported Height 6' 1\"        Constitutional: [x] Appears well-developed and well-nourished [x] No apparent distress      [] Abnormal -     Mental status: [x] Alert and awake  [x] Oriented to person/place/time [x] Able to follow commands    [] Abnormal -     Eyes:   EOM    [x]  Normal    [] Abnormal -   Sclera  [x]  Normal    [] Abnormal -          Discharge []  None visible   [] Abnormal -     HENT: [x] Normocephalic, atraumatic  [] Abnormal -   [] Mouth/Throat: Mucous membranes are moist    Neck: [x] No visualized mass [] Abnormal -     Pulmonary/Chest: [x] Respiratory effort normal   [x] No visualized signs of difficulty breathing or respiratory distress        [] Abnormal -         Skin:        [x] No significant exanthematous lesions or discoloration noted on facial skin         [] Abnormal -            Psychiatric:       [x] Normal Affect [] Abnormal -        [x] No Hallucinations    Other pertinent observable physical exam findings:    Assessment & Plan:   Lele Gary is a 60 y.o. male who presents today for:    1. Sinusitis, unspecified chronicity, unspecified location  discussed diagnosis & treatment options, most likely viral at this time, reviewed the importance of avoiding unnecessary antibiotic therapy but gave delayed prescription if symptoms worsen, Flonase, sinus rinses. Reviewed signs and symptoms that would indicate a worsening medical condition which would require immediate evaluation and treatment; patient expressed understanding of plan.   - amoxicillin-clavulanate (Augmentin) 875-125 mg per tablet; Take 1 Tablet by mouth every twelve (12) hours for 5 days. Dispense: 10 Tablet; Refill: 0       There are no discontinued medications. Treatment risks/benefits/costs/interactions/alternatives discussed with patient. Advised patient to call back or return to office if symptoms worsen/change/persist. If patient cannot reach us or should anything more severe/urgent arise he/she should proceed directly to the nearest emergency department. Discussed expected course/resolution/complications of diagnosis in detail with patient. Patient expressed understanding with the diagnosis and plan. This dictation may have been completed with Dragon, the [employer] voice recognition software. Unanticipated grammatical, syntax, homophones, and other interpretive errors are sometimes inadvertently transcribed by the computer software. Please disregard any errors that have escaped final proofreading. Geri Soto M.D.

## 2022-04-26 DIAGNOSIS — J45.30 MILD PERSISTENT ASTHMA WITHOUT COMPLICATION: ICD-10-CM

## 2022-04-26 RX ORDER — MONTELUKAST SODIUM 10 MG/1
10 TABLET ORAL DAILY
Qty: 90 TABLET | Refills: 3 | Status: SHIPPED | OUTPATIENT
Start: 2022-04-26

## 2022-04-26 NOTE — TELEPHONE ENCOUNTER
Last Visit: VV 4/22/22, OV 3/30/22 MD Plata  Next Appointment: None  Previous Refill Encounter(s): 12/28/21 90    Requested Prescriptions     Pending Prescriptions Disp Refills    montelukast (SINGULAIR) 10 mg tablet 90 Tablet 3     Sig: Take 1 Tablet by mouth daily.

## 2022-06-24 DIAGNOSIS — K27.9 PEPTIC ULCER DISEASE: ICD-10-CM

## 2022-06-24 DIAGNOSIS — F32.A CHRONIC DEPRESSION: ICD-10-CM

## 2022-06-24 DIAGNOSIS — E79.0 HYPERURICEMIA: ICD-10-CM

## 2022-06-24 RX ORDER — OMEPRAZOLE 20 MG/1
CAPSULE, DELAYED RELEASE ORAL
Qty: 90 CAPSULE | Refills: 2 | Status: SHIPPED | OUTPATIENT
Start: 2022-06-24

## 2022-06-24 RX ORDER — FLUOXETINE HYDROCHLORIDE 20 MG/1
CAPSULE ORAL
Qty: 90 CAPSULE | Refills: 2 | Status: SHIPPED | OUTPATIENT
Start: 2022-06-24

## 2022-06-24 RX ORDER — ALLOPURINOL 100 MG/1
TABLET ORAL
Qty: 90 TABLET | Refills: 2 | Status: SHIPPED | OUTPATIENT
Start: 2022-06-24

## 2022-12-24 NOTE — TELEPHONE ENCOUNTER
Explain to pt that insurance doesn't cover testosterone as screening part of routine labs at checkups. They will cover if pt has certain symptoms on history to physician which can them be ordered for diagnostic purposes. So if he did not discuss his sx at time of appt, it would not have been added. Sorry. If he has concerns he can make a routine appt/problem visit. [facility]    Medicine Progress Note - Hospitalist Service    Date of Admission:  8/11/2022    Brief summary:  59yo M  with PMH of ESRD on HD, recurrent cellulitis with massive lymphedema/elephantiasis, morbid obesity, pulmonary HTN, multiple hospitalizations since March of 2022 due to bacteremia with a variety of species identified, most notably Klebsiella, streptococcus and Morganella (source thought to be related to chronic cellulitis of his legs).   On 7/4/22, he presented to OSH ED following an episode of hypotension and bradycardia on dialysis. On ED presentation, SBPs were in the 60's-70's. Lactate was 13.5, WBC 4.7, procal was 0.48. Pressures were minimally responsive to fluid resuscitation, ultimately required pressors. Found to have a mobile, vegetative mass of the left coronary cusp with associated severe aortic regurgitation with concern of aortic root abscess. Was started on vanc following ID consultation. Blood cultures have had no growth to date. Cardiology and cardiothoracic surgery were consulted and initially felt the patient was not a surgical candidate given his ongoing pressor requirements. Following improvement of lactate, patient was felt to be a potential operative candidate and was ultimately transferred to [facility] for further treatment and possible cardiothoracic intervention. Underwent aortic valve replacement (INSPIRIS RESILIA AORTIC VALVE 25MM) and CABG x1 (LIMA -> LAD), open chest on 7/12 by Dr. Dunbar, tooth extraction 7/22 with dental. Prolonged ICU course due to ongoing vasopressor needs and CRRT, transitioned to iHD and off pressors. He was severely deconditioned and required long-term antibiotics for endocarditis. Was admitted into [facility] for further treatment and cares 8/11/22, on IV abx and on room air.    [facility] Course:  8/11- 8/21: Care conference on 8/18 with sister, care team.  Asymptomatic short few beat VT runs intermittently. Bradycardic spell improved with BiPAP.  Continue  telemetry.  Remains on amiodarone.  US abdomen/Dopplers 8/17 unremarkable.  LFTs improving, stable CBC.  Lipase 52, lactate normal.  encouraged to use BiPAP.   Remains constantly nauseated, not eating much due to nausea.  Tubefeedings changed to bolus per RD recommendations 8/15.  Holding Renvela to see if that helps nausea (started 8/12, stopped 8/18), continue to hold Actigall.  Nausea seems to be improved with holding Renvela therefore now discontinued.  Phosphate 6.2 on 8/19 and 5.7 on 8/21.  Plan to start lanthanum by early next week once nausea is resolved to assess any GI side effects from phosphate binder. Minor nasal bleeding due to NG tube, started saline nasal spray with improvement. Continue with therapies for lymphedema, physical deconditioning and wound cares.  On room air and nocturnal BiPAP. Continue IV antibiotics (Rocephin, doxycycline).   Updated sister.  8/22-8/28: Patient has been struggling with nausea on and off.  We adjusted his tube feeding schedule and this helped with nausea.  We also offered him IV Zofran.  He was able to tolerate oral diet well.  NG tube discontinued 8/25.  Patient progressing well.  Reported indigestion 8/26.  Was started on Tums as needed.  Today,8/28 he states he is doing well.  Indigestion controlled and tolerating diet.  He reports no new complaints.     9/5-9/11:Progessing well.  Dialyzing and tolerating oral diet.  Had intermittent nausea that is controlled with Zofran 9/8.  Otherwise social work working for placement to TCU.  Having challenges to find an appropriate place due to dialysis.  9/11, No new changes today.  Continue current medical management.  9/12-9/18: Loose stool improved with cholestyramine (started 9/13) .  9 /12 - Dialysis limited by hypotension and deconditioned state (unable to dialyze in chair). Dialysis in chair on 9/16/22 (no UF d/t hypotension) but tolerating. TCU placement PENDING. Next dialysis is 9/19/22 in chair.   9/19.  Patient  dialyzing unfortunately the did not put him in a chair.  He states he is doing well.  I had a conversation with nephrology and they will pay more attention to dialyzing in a chair.  Otherwise no new complaints today.  Just about the same compared to yesterday.  He has a sodium of 129  9/20-9/25. Patient reports he is progressing well.  Working well with therapy. He reports no complaints at this time.  Patient currently displaying no signs/symptoms of TB 9/21. Patient started dialyzing in a chair.  Has been progressing well. Still unable to ambulate.  Hyponatremia resolving.  Most recent sodium on 9/23, 134.  Has not been able to effectively ambulate on his own,working with therapies.  Encouraging patient to get out of bed.  9/25. Doing well. No new changes at this time. Awaiting placement.  9/26-10/16: Progressing well with therapies.  Dialyzing well MWF.  Oral intake adequate with occasional nausea especially with dialysis, Zofran effective if needed.  Has lost weight of over >100 lbs (from 375 lbs to now 245 lbs).  Sister expressed concerns regarding patient's eating habits, morbid obesity and focus on food. Continue to emphasize importance of low calorie diet healthy lifestyle, compliance to medications and medical follow-up to patient.  He remains motivated and engaged in therapies.  Stopped cholestyramine 9/30 since now constipated, started bowel regimen with Dulcolax suppository, MiraLAX and Kandice-Colace as needed. Started fleets enema 10/13 with adequate results.  Has painful hemorrhoids with minor rectal bleeding, start Anusol HC suppository.  Patient refused oral mineral oil, hemorrhoidal pain improved with topical hydrocortisone-pramoxine.  Increased docusate to 400 mg twice daily for couple of days.  Since constipation now improving after intensifying bowel regimen, decreased docusate and Kandice-Colace.  Lymphedema progressively improving. On fluid restrictions per nephrology.  PICC line removed 10/13/2022.   "Drawing labs on dialysis days.  Awaiting placement  10/17-10/23:  OT noted patient previously refusing to work with therapy.  Apparently he had refused almost 10 sessions of therapy.  Patient noted he feels weak and tired especially on his dialysis days and he does not want engage in therapy.  We encouraged patient.  He is now willing to try alternate therapy.  Otherwise no new other changes.  He is now dialyzing in a chair.  10/23.  Doing well.  Continue with current medical management.  Awaiting placement.  10/24-10/30: No acute events. TCU placement PENDING. Medication/ Management changes: (1)  titrated of PPI as GI ppx not indicated at this time (2) discontinued torsemide as patient was producing minimal urine (3) Midodrine prn and scheduled, adjusted EDW and cut back on UF as patient was having orthostatic hypotension.  -Activity Goals discussed with the patient:   (1) HD must be in chair for each HD session.   (2) Out in chair at 10am daily, to work with PT.   10/31-11/5.  Patient doing well.  No new changes.  Has been dialyzing in a chair.  Gaining strength.  11/5.  Continue current medical management.  Waiting for placement  11/7-11/13: This week pt's INR remained subtherapeutic and heparin subQ was increased from q12 to q8 to help cover. Question whether previous INR >10 was real. INR uptrending. Still with orthostasis during PT but improving with midodrine timing prior to therapy and with HD. Had nausea 11/11-11/12 likelky 2/2 orthostasis now improved. Intermittently refusing lab draws (\"too many needle sticks\") and late administration of heparin ovn. Placement remains pending. Edema greatly improved, likely nearing end of fluid removal.   11/14-11/20. Had nausea on and off with 1 episode of nonbilious emesis.Controlled with Zofran. INR 11/13 is 2.24. Heparin subcuQ discontinued.Has been dialyzing as scheduled per nephrology.11/17, Patient refusing working with therapies.11/18.  Dietary reported patient " has been refusing meals since 11/13/2022.  Had a detailed discussion with patient on his refusal working with therapies when needed and also taking meals.  He promised that he is going to change and will try to work with therapy more often and will try to eat.  I informed him the other option will be enteral feeding. 11/20.  Eating some of his meals now, no other changes at this time.  Continue with current medical management. Waiting for placement.  11/21-11/27: Continues to have intermittent nausea. Responds to zofran. Stopped compazine, started reglan. Stopped his midodrine and started droxidopa (NE precursor) and are uptitrating (celing is 600mg TID). Consider NET inhibitor as alternative, pharmacy aware, NE levels already drawn prior to droxidopa starting. Still having difficulty working with PT. Placement remains a problem.   11/28-12/4: Complex situation: Ongoing hypotension/ nausea/ poor appetite and po intakepoor participation with PT secondary to hypotension/ fatigue. Reduced PO intake, wt loss, declines tube feeding. GOC - palliative care  12/1 : goals of life prolonging with limits no feeding tube.  Regarding nausea and orthostatic hypotension:   -Continued to have intermittent nausea. Antiemetics adjusted given prolonged QTc and patient response. Some improvement in nausea with and humaira essential oil and sea bands. Discontinued droxidopa (which patient refused last doses, attributed worsening nausea to medication). Some improvement in SBP with  trial of atomoxetine 10mg BID (started 12/2/22); SBP more consistently in 90s.    12/5-12/11: Pt mostly eating street food but is increasing daily intake. Atomoxetine at 18mg BID (max dose for BP indication) and now restarted midodrine 10mg TID for additional therapy. Nausea much improved this week. Still having difficulty progressing with PT. Was started on apixaban now that INR < 2.0. Epistaxis and BRBPR on 12/10, apixaban held, plan to restart Monday morning  if no further bleeding. Pt wishes trial off BiPAP, will do night of 12/11 with VBG in AM. Pt needs polysomnography as outpatient.  12/12-12/18.  ABG on 12/12 within normal limits.  Not using BiPAP at night.  Will need monitoring continuous pulse oximetry at night.  12/13.  Not having adequate oral intake, worsening epistaxis became hypotensive seems to be declining.  H&H fairly stable.  12/15 attended care conference with sister, ENT consulted for possible cauterization they recommended nasal normal saline with mupirocin.  Should epistaxis continue consider IR consult for cauterization.  12/16, feels better, epistaxis fairly controlled.  12/18, just about the same.  H&H stable.  Consider starting anticoagulation with Eliquis and aspirin tomorrow.  Otherwise continue current medical management.     12/19 - Stable.   12/20 - nutrition reporting still under target for intake goal. DTI to buttocks. Still refusing turning. GOC today with Massimo, see A/P  12/21 - Holding apixaban per pt request. Stopped atomoxetine. Will attempt to speak with sister Iliana today.  12/22 - spoke at length with Iliana, see note 12/21 for details. BP remains stable. Massimo is agreeable to meeting with Iliana, myself, and him in person 12/26. Will reach out to Iliana and update.  12/23 - Stable today. Will restart apixaban/asa 12/24 12/24 - apixaban/asa restarted. Nephro note mentions pt refusing HD in chair, will be difficult to do OP as need to tolerate chair x3 day/week     Follow-ups:  - Family meeting 12/26  -No specific follow-up arranged with Cardiology, Cardiac Surgery.  -Recommend routine follow-up after [facility] discharge with Cardiac Surgery and with Cardiology  -Nephrology follow-up with hemodialysis    Assessment & Plan       Goal of Care  -Complex situation: ongoing hypotension/ nausea/ poor participation in PT secondary to hypotension/ fatigue. Patient is not eating, loosing weight, declined tube feeds. There may also be a psychological  "component to his not eating and lack of motivation to participate although he consistently states he wants to participate.    12/20  - I discussed with Massimo (alone) my concerns over his nutritional status and decline  - discussed my concern that, despite optimal medical management of his nausea/fatigue/orthostasis presently, he continues to lose weight and not meed his daily nutritional needs  - discussed that this is multifactorial and may be both physiological and psychological  - discussed the concern that if he is unable to increase nutritional intake he will continue to lose weight and decline clinically  - Massimo states that \"I will beat this\" and that \"people have always told me what I could not do and I have always found a way to beat it!\"  - Massimo feels that \"it is my fault I am not eating more\" and that he has somehow failed to try hard enough  - I reiterated that the medical team do not feel that he is choosing to not eat but that this is most likely out of his control  - I told Massimo that if he continues to clinically decline and lose weight we will need to make a decision about his future, whether that be aggressive or conservative care  - He is presently unwilling or unable to acknowledge that he may not be able to overcome this obstacle. In particular, he reiterates that \"I will beat this no matter what.\"  - I asked him to think about what would happen and what he would want if, for whatever reason, he were unable to eat enough to maintain his weight.  - I told him that I wanted to discuss this separately with his sister (Iliana) and then set up a time for all three of us to discuss this later this week. Massimo was agreeable to this    12/21  - spoke extensively with Iliana over the phone, see Family Meeting Note from 12/21 for further details   - plans for further in person meeting with Iliana and Massimo tentatively 12/26    Epistaxis - Stable  -Continue with mupirocin ointment and nasal saline for epistaxis per " ENT  -H&H stable at this time.  -ENT recommended should epistaxis worsens then he will need an IR consult for cauterization  - apixaban/asa on/off for epistaxis. Held since 12/13, now resumed today    Hx of endocarditis - s/p AVR (Inspiris, bioprosthetic) and CABG x1 (BUTTERFIELD to LAD) by Dr. Dunbar on 7/12- left open-chested, Chest closed/plated on 7/14  Endocarditis with aortic root abscess  Severe aortic insufficiency- improved  Tricuspid regurgitation- mild  Coronary Artery Disease  Atrial Fibrillation  Multifactorial shock (septic, cardiogenic) resolved  Morbid obesity  Pulmonary HTN, severe (PA pressures of 62 on last TTE 8/3) no treatment indicated at this time.  HFrEF (35-40% on admission), improved to 55-60 % on TTE 8/3  -Was on longstanding pressors from 7/12>8/7  -Steroids:  s/p Stress dose steroids: Hydrocortisone 50 mg q6, completed on 8/7. Previously on prednisone 5 mg daily transitioned to prednisone taper, ended 10/7.  - Not on beta blocker at this time due to previously low BP  Plan:  - Continue ASA 81 mg daily  - Continue Lipitor 40 mg daily  - Continue Amiodarone 200 mg daily for Afib (maintenance dose)(periodic few beat asymptomatic VT runs observed on telemetry but stable)  - Apixaban 5mg BID (given non-compliance with lab draws, started 12/6)  - Sternal precautions in place    Orthostatic Hypotension  - Orthostatic hypotension has been a barrier to patient working with PT  - Mild hyponatremia, managed with HD  - Was on midodrine (stopped as thought insufficient BP improvement), then droxidopa (stopped 2/2 nausea 12/3)  - discontinue Atomoxetine 18mg BID (12/20) after d/w patient regarding lack of benefit to BP  - continue midodrine 10mg TID  - NE level drawn 832 (11/23/22)  -Previous hospitalist discussed with Nephrology (11/29) : okay for 500cc bolus for hypotension/ orthostatics + or symptomatic  - Will evaluate with cosyntropin stimulation test- nl    Nausea, multifactorial  -Ongoing  intermittent nausea/ with occasional dry heaving and some emesis since admission  - Multifactorial, due to uremia? orthostatic hypotension, possibly anticipatory nausea and anxiety,  -Therapies that were tried:  -Discontinued Zofran 4mg q6h prn (11/28), given prolonged QTc  -Metoclopramide 5mg TID (started 11/27 , transitioned to prn 11/29 given prolonged QTc, discontinued 12/4 as patient was not utilizing)  -Compazine 5mg PO QAM scheduled prior to AM medicine for possible anticipatory nausea.   -Ginger essential oil cotton balls Q6H and sea bands as needed  -Management of orthostatic hypotension as above    Severe Protein-Calorie Malnutrition  Debility, 2/2 chronic illness and prolonged hospitalization  -Dietitian consulted and following  -Speech therapy consulted and following  -Poor appetite, early satiety (not candidate for Reglan due to prolonged QTc)   -NG tube discontinued 8/25  -Encouraged patient to eat his meals as recommended by registered dietitian.   -Per GO (12/1) : does not want enteral feeding / PEG   -Patient has had a challenge of oral intake due to nausea, nutrition has been a barrier to progress in terms of strength and conditioning    QTc Prolongation  - (585 on 11/28, QTc 581 on 11/30); he was on zofran, amiodarone, reglan. Discontinued zofran, trialing compazine. Reglan transitioned to prn instead of scheduled.    - Continue to monitor    History of Acute respiratory failure- resolved. Extubated at previous hospital. Now on room air  KAYDEN  -Stable at this time  -Saturating well on RA/BiPAP at night.   -Continue nocturnal BiPAP as tolerated, nebs as needed  -Trial off BiPAP 12/8, refused ABG on 12/9. Pt awake all night, wants to postpone a few days   - Unclear if pt has hx of polysomnography for KAYDEN, would need as OP after discharge     Encephalopathy, suspect toxic metabolic- resolved  Anxiety  Depression  -No confusion at this time  -Sertraline discontinued (pt/sister request, may be  worsening nausea per pt)  -Trazodone discontinued (pt/sister request)  -PTA meds ON HOLD: Alprazolam 0.25mg PRN, tramadol 50mg PRN, trazodone 100mg , melatonin 10mg     End-stage renal disease, on dialysis MWF  Electrolyte Abnormalities  Hyponatremia.   - Patient sodium in the low 130's but stable.  Continue fluid restriction.  Nephrology consulted and following.  -HD per Nephrology MWF, tolerating well   -Replete electrolytes as indicated  -Retacrit per nephrology  -Trial of torsemide discontinued 10/26 , oliguria  -Phosphate binding: Was on Sevelamer 8/12-  8/18 and this was discontinued due to nausea. Then Lanthanum but held d/t lower phos levels. Binders held since 10/27/22.  -Strict I/O, daily weights  -Avoid / limit nephrotoxins as able    Deep Tissue Injury, sacrum  - likely pressure related  - wound care per nursing    Diarrhea, Resolved  -C Diff negative 7/18, 8/2    Constipation, intermittent  Painful hemorrhoids, controlled  -s/p Cholestyramine (started 9/13, stopped 9/30 since constipation developed)  -Constipation flared up painful hemorrhoids and minor rectal bleeding.  -Stool softeners currently discontinued  -Pt does refuse bowel regimen intermittently. Refusing suppository when constipated.      Acute blood loss anemia and thrombocytopenia. RUE DVT (RIJ)   Hgb as low as 7.6.  Transfused 1 unit PRBC 8/15.    -No signs or symptoms of active bleeding at this time  - H&H stable at this time  -Transfuse to keep Hgb >8 given CAD  -Retacrit per Nephrology     Anticoagulation/DVT prophylaxis  -ASA 81mg  -apixaban 5mg BID     Sternotomy Wound  Surgical incision  - Continue wound care    Infective endocarditis with aortic root abscess. Treated  H/o bacteremia with strep sp, morganella, and klebsiella  Periapical dental abscess (2nd and 3rd R molars). Sutures dissolvable  Remains afebrile, no signs or symptoms of infection  -Repeat blood culture 8/4, NGTD  -ID previously consulted   -Completed course  antibiotics : Doxycycline (end date 8/28) and Ceftriaxone (end date 8/25)  -Continue to monitor fever curve, CBC    ALMANZAR - Stable  Transaminitis, trended   Hyperbilirubinemia-Stable  Hepatosplenomegaly - stable  -LFTs: have trended down in the last couple of weeks (AST//115 --> 66/70).  T. bili also trending down from 3.5  to 0.6, 10/24.    -Pharmacological Agents: Previously on Ursodiol 300 BID for hyperbilirubinemia, previously held 8/16 due to ongoing nausea. Discontinued Ursodiol 8/25.  -Imaging:   -US abdomen 7/18/2022 showed hepatosplenomegaly otherwise unremarkable. GB not well visualized.   -US abdomen/Dopplers 8/17 unremarkable with stable hepatosplenomegaly.     Morbid obesity  Elephantiasis with chronic lymphedema of lower extremities  -Continue wound cares  -Significant weight loss since admit from 375 lbs to now 228 lbs    Stress induced hyperglycemia -resolved  Hgb A1c 5.9  - Initially managed on insulin drip postoperatively, transitioned now off insulin     GI PPX -Not indicated currently.  -Discontinued PPI (10/30). Started GI ppx post-operatively after CABG during acute hospitalization    -Patient tolerating diet (10/30), no symptoms of GERD/ PUD    Diet: Fluid restriction 1800 ML FLUID  Snacks/Supplements Adult: Nepro Oral Supplement; With Meals  Regular Diet Adult    DVT Prophylaxis: Warfarin  Darden Catheter: Not present  Central Lines: PRESENT  CVC Double Lumen Left Subclavian Tunneled-Site Assessment: WDL    Cardiac Monitoring: ACTIVE order. Indication: QTc prolonging medication (48 hours)  Code Status: Full Code    Dispo: stable, pending placement    The patient's care was discussed with the nursing staff.    Bernabe Casillas MD  Hospitalist Service  [facility]  Securely message with the Vocera Web Console (learn more here)  Text page via [employer] Paging/Directory   ______________________________________________________________________     Interval History                                                                                                       - no acute events ovn  - pt seen sitting up in chair   - feels good today  - restarted apixaban/asa  - family will be coming tomorrow to celebrate Belcher with him  - no other acute concerns   - denies cough, SOB, fever/chills, n/v/c, CP, pain    Review of system: All other systems are reviewed and found to be negative except as stated above in the interval history.    Physical Exam   Vital Signs: Temp: 97.2  F (36.2  C) Temp src: Oral BP: 91/60 Pulse: 78   Resp: 20 SpO2: 97 % O2 Device: None (Room air)    Weight: 223 lbs 4.8 oz   Vitals:    12/21/22 0614 12/22/22 0104 12/24/22 0242   Weight: 102 kg (224 lb 14.4 oz) 101.3 kg (223 lb 4.8 oz) 101.3 kg (223 lb 4.8 oz)     General: He is a well grown well-developed adult male lying in bed comfortably no distress.  Head: Appears normocephalic atraumatic eyes pupils appear equal round and reactive to light  Lungs: He has a normal respiratory effort and auscultation breath sounds are clear.  Heart: He has a good S1 and S2 no obvious murmurs, no JVD peripheral pulses are palpable.  Abdomen: Soft nontender nondistended bowel sounds are noted no obvious organomegaly noted.  Musculoskeletal : He has good muscle tone.  Bilateral lymphedema noted.  I did not assess range of motion.   Skin : Elephantiasis bilateral lower extremities,  Mid sternal wound noted. Please refer to wound care/nursing note for complete skin assessment     Data reviewed today: I reviewed all medications, new labs and imaging results over the last 24 hours. I personally reviewed     Data   Recent Labs   Lab 12/19/22  0915   WBC 6.4   HGB 9.3*   MCV 99   PLT 98*   *   POTASSIUM 3.7   CHLORIDE 93*   CO2 25   BUN 21.1   CR 4.04*   ANIONGAP 13   ABHIJIT 9.1   GLC 87   ALBUMIN 2.7*   PROTTOTAL 7.1   BILITOTAL 0.8   ALKPHOS 92   ALT 22   AST 66*     No results found for this or any previous visit (from the past 24 hour(s)).  Medications     heparin  (porcine) Stopped (12/23/22 7239)     - MEDICATION INSTRUCTIONS -         amiodarone  200 mg Oral Daily     apixaban ANTICOAGULANT  5 mg Oral BID     artificial saliva  30 mL Swish & Spit 4x Daily     aspirin  81 mg Oral Daily     atorvastatin  40 mg Oral QPM     epoetin fercho-epbx  6,000 Units Intravenous Weekly     midodrine  10 mg Oral 3 times per day on Sun Tue Thu Sat     midodrine  15 mg Oral 3 times per day on Mon Wed Fri     mineral oil-hydrophilic petrolatum   Topical Daily     multivitamin RENAL  1 tablet Oral Daily     mupirocin   Topical Daily     prochlorperazine  5 mg Oral BID AC     sodium chloride (PF)  3 mL Intracatheter Q8H

## 2023-05-19 RX ORDER — FLUOXETINE HYDROCHLORIDE 20 MG/1
CAPSULE ORAL
Qty: 90 CAPSULE | Refills: 0 | Status: SHIPPED | OUTPATIENT
Start: 2023-05-19

## 2023-06-17 ENCOUNTER — TELEPHONE (OUTPATIENT)
Age: 58
End: 2023-06-17

## 2023-06-17 DIAGNOSIS — Z12.5 SCREENING PSA (PROSTATE SPECIFIC ANTIGEN): ICD-10-CM

## 2023-06-17 DIAGNOSIS — Z00.00 ANNUAL PHYSICAL EXAM: Primary | ICD-10-CM

## 2023-06-17 DIAGNOSIS — E79.0 HYPERURICEMIA: ICD-10-CM

## 2023-06-20 RX ORDER — OMEPRAZOLE 20 MG/1
CAPSULE, DELAYED RELEASE ORAL
Qty: 30 CAPSULE | Refills: 0 | Status: SHIPPED | OUTPATIENT
Start: 2023-06-20

## 2023-06-20 RX ORDER — MONTELUKAST SODIUM 10 MG/1
TABLET ORAL
Qty: 30 TABLET | Refills: 0 | Status: SHIPPED | OUTPATIENT
Start: 2023-06-20

## 2023-06-20 NOTE — TELEPHONE ENCOUNTER
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 He couldn't get one here in the time frame needed so he went to another place on [address] [address]. He can't remember the name. He had fasting labs and CPE done there. He will contact them and have them send over the information to us. I gave him my direct fax #. He is scheduled for CPE with us on 7/30. Advised that if that place billed for a CPE he doesn't want to have another one and have a large bill. He wanted to know if they sent over the labs would he still need to come in. Advised that Dr Marilee Mcgrath is prescribing the medication and she does need to see him. I have scheduled him for a f/u here on 7/11 for med check. His local pharmacy is [facility] on [facility]. Let him know that we would send over a 30 day supply to cover him.

## 2023-06-20 NOTE — TELEPHONE ENCOUNTER
I sent mychart message with letter and list of the [facility] walk in clinic that he can go to.

## 2023-07-11 ENCOUNTER — OFFICE VISIT (OUTPATIENT)
Age: 58
End: 2023-07-11
Payer: COMMERCIAL

## 2023-07-11 VITALS
HEART RATE: 68 BPM | OXYGEN SATURATION: 96 % | TEMPERATURE: 98.1 F | HEIGHT: 73 IN | WEIGHT: 208.6 LBS | RESPIRATION RATE: 16 BRPM | DIASTOLIC BLOOD PRESSURE: 80 MMHG | SYSTOLIC BLOOD PRESSURE: 124 MMHG | BODY MASS INDEX: 27.65 KG/M2

## 2023-07-11 DIAGNOSIS — Z12.5 SCREENING PSA (PROSTATE SPECIFIC ANTIGEN): ICD-10-CM

## 2023-07-11 DIAGNOSIS — F32.A CHRONIC DEPRESSION: ICD-10-CM

## 2023-07-11 DIAGNOSIS — K27.9 PEPTIC ULCER DISEASE: ICD-10-CM

## 2023-07-11 DIAGNOSIS — Z00.00 ANNUAL PHYSICAL EXAM: Primary | ICD-10-CM

## 2023-07-11 DIAGNOSIS — J30.89 ENVIRONMENTAL AND SEASONAL ALLERGIES: ICD-10-CM

## 2023-07-11 DIAGNOSIS — K21.9 GERD WITH STRICTURE: ICD-10-CM

## 2023-07-11 DIAGNOSIS — E79.0 HYPERURICEMIA: ICD-10-CM

## 2023-07-11 DIAGNOSIS — J45.30 MILD PERSISTENT ASTHMA WITHOUT COMPLICATION: ICD-10-CM

## 2023-07-11 DIAGNOSIS — K22.2 GERD WITH STRICTURE: ICD-10-CM

## 2023-07-11 LAB
ALBUMIN SERPL-MCNC: 4 G/DL (ref 3.5–5)
ALBUMIN/GLOB SERPL: 1.3 (ref 1.1–2.2)
ALP SERPL-CCNC: 92 U/L (ref 45–117)
ALT SERPL-CCNC: 36 U/L (ref 12–78)
ANION GAP SERPL CALC-SCNC: 5 MMOL/L (ref 5–15)
AST SERPL-CCNC: 29 U/L (ref 15–37)
BILIRUB SERPL-MCNC: 0.6 MG/DL (ref 0.2–1)
BUN SERPL-MCNC: 14 MG/DL (ref 6–20)
BUN/CREAT SERPL: 12 (ref 12–20)
CALCIUM SERPL-MCNC: 9.3 MG/DL (ref 8.5–10.1)
CHLORIDE SERPL-SCNC: 108 MMOL/L (ref 97–108)
CHOLEST SERPL-MCNC: 190 MG/DL
CO2 SERPL-SCNC: 27 MMOL/L (ref 21–32)
CREAT SERPL-MCNC: 1.15 MG/DL (ref 0.7–1.3)
ERYTHROCYTE [DISTWIDTH] IN BLOOD BY AUTOMATED COUNT: 13.4 % (ref 11.5–14.5)
GLOBULIN SER CALC-MCNC: 3 G/DL (ref 2–4)
GLUCOSE SERPL-MCNC: 97 MG/DL (ref 65–100)
HCT VFR BLD AUTO: 41.5 % (ref 36.6–50.3)
HDLC SERPL-MCNC: 55 MG/DL
HDLC SERPL: 3.5 (ref 0–5)
HGB BLD-MCNC: 13.7 G/DL (ref 12.1–17)
LDLC SERPL CALC-MCNC: 114.2 MG/DL (ref 0–100)
MCH RBC QN AUTO: 29 PG (ref 26–34)
MCHC RBC AUTO-ENTMCNC: 33 G/DL (ref 30–36.5)
MCV RBC AUTO: 87.7 FL (ref 80–99)
NRBC # BLD: 0 K/UL (ref 0–0.01)
NRBC BLD-RTO: 0 PER 100 WBC
PLATELET # BLD AUTO: 302 K/UL (ref 150–400)
PMV BLD AUTO: 9.8 FL (ref 8.9–12.9)
POTASSIUM SERPL-SCNC: 4.5 MMOL/L (ref 3.5–5.1)
PROT SERPL-MCNC: 7 G/DL (ref 6.4–8.2)
PSA SERPL-MCNC: 0.4 NG/ML (ref 0.01–4)
RBC # BLD AUTO: 4.73 M/UL (ref 4.1–5.7)
SODIUM SERPL-SCNC: 140 MMOL/L (ref 136–145)
TRIGL SERPL-MCNC: 104 MG/DL
TSH SERPL DL<=0.05 MIU/L-ACNC: 1.22 UIU/ML (ref 0.36–3.74)
URATE SERPL-MCNC: 6.4 MG/DL (ref 3.5–7.2)
VLDLC SERPL CALC-MCNC: 20.8 MG/DL
WBC # BLD AUTO: 5.7 K/UL (ref 4.1–11.1)

## 2023-07-11 PROCEDURE — 99396 PREV VISIT EST AGE 40-64: CPT | Performed by: FAMILY MEDICINE

## 2023-07-11 RX ORDER — ALLOPURINOL 100 MG/1
100 TABLET ORAL DAILY
Qty: 90 TABLET | Refills: 3 | Status: SHIPPED | OUTPATIENT
Start: 2023-07-11

## 2023-07-11 RX ORDER — FLUOXETINE HYDROCHLORIDE 20 MG/1
20 CAPSULE ORAL DAILY
Qty: 90 CAPSULE | Refills: 2 | Status: SHIPPED | OUTPATIENT
Start: 2023-07-11

## 2023-07-11 RX ORDER — OMEPRAZOLE 20 MG/1
20 CAPSULE, DELAYED RELEASE ORAL DAILY
Qty: 90 CAPSULE | Refills: 3 | Status: SHIPPED | OUTPATIENT
Start: 2023-07-11

## 2023-07-11 RX ORDER — MONTELUKAST SODIUM 10 MG/1
10 TABLET ORAL DAILY
Qty: 90 TABLET | Refills: 3 | Status: SHIPPED | OUTPATIENT
Start: 2023-07-11

## 2023-07-11 SDOH — ECONOMIC STABILITY: HOUSING INSECURITY
IN THE LAST 12 MONTHS, WAS THERE A TIME WHEN YOU DID NOT HAVE A STEADY PLACE TO SLEEP OR SLEPT IN A SHELTER (INCLUDING NOW)?: NO

## 2023-07-11 SDOH — ECONOMIC STABILITY: FOOD INSECURITY: WITHIN THE PAST 12 MONTHS, THE FOOD YOU BOUGHT JUST DIDN'T LAST AND YOU DIDN'T HAVE MONEY TO GET MORE.: NEVER TRUE

## 2023-07-11 SDOH — ECONOMIC STABILITY: INCOME INSECURITY: HOW HARD IS IT FOR YOU TO PAY FOR THE VERY BASICS LIKE FOOD, HOUSING, MEDICAL CARE, AND HEATING?: NOT HARD AT ALL

## 2023-07-11 SDOH — ECONOMIC STABILITY: FOOD INSECURITY: WITHIN THE PAST 12 MONTHS, YOU WORRIED THAT YOUR FOOD WOULD RUN OUT BEFORE YOU GOT MONEY TO BUY MORE.: NEVER TRUE

## 2023-07-11 ASSESSMENT — ENCOUNTER SYMPTOMS
GASTROINTESTINAL NEGATIVE: 1
RESPIRATORY NEGATIVE: 1
CONSTIPATION: 0
DIARRHEA: 0
EYES NEGATIVE: 1
BLOOD IN STOOL: 0

## 2023-07-11 ASSESSMENT — PATIENT HEALTH QUESTIONNAIRE - PHQ9
SUM OF ALL RESPONSES TO PHQ9 QUESTIONS 1 & 2: 0
SUM OF ALL RESPONSES TO PHQ QUESTIONS 1-9: 0
2. FEELING DOWN, DEPRESSED OR HOPELESS: 0
1. LITTLE INTEREST OR PLEASURE IN DOING THINGS: 0

## 2023-07-11 NOTE — PROGRESS NOTES
Chief Complaint   Patient presents with    Annual Exam     Fasting     1. \"Have you been to the ER, urgent care clinic since your last visit? Hospitalized since your last visit? \" No    2. \"Have you seen or consulted any other health care providers outside of the [address] [place] [address] since your last visit? \"  Had biometric screening for his job    3. For patients aged 43-73: Has the patient had a colonoscopy / FIT/ Cologuard? Yes - no Care Gap present 12/2014 Normal, follow up 10 yrs, Dr Harlan Osler      If the patient is female:    4. For patients aged 43-66: Has the patient had a mammogram within the past 2 years? NA - based on age or sex      11. For patients aged 21-65: Has the patient had a pap smear?  NA - based on age or sex
R-3Normal  6. Environmental and seasonal allergies  -     montelukast (SINGULAIR) 10 MG tablet; Take 1 tablet by mouth daily, Disp-90 tablet, R-3Normal  7. Peptic ulcer disease  -     omeprazole (PRILOSEC) 20 MG delayed release capsule; Take 1 capsule by mouth daily, Disp-90 capsule, R-3Normal  8. GERD with stricture  -     omeprazole (PRILOSEC) 20 MG delayed release capsule; Take 1 capsule by mouth daily, Disp-90 capsule, R-3Normal    Fasting labs checked today  Reviewed diet, nutrition, exercise, weight management/goals, risk reduction, cardiovascular goals for age and associated health risks, medications, effects, risks, benefits, potential interactions and possible side effects. Age/risk based screening recommendations, health maintenance & prevention counseling. Cancer screening USPTFS guidelines reviewed w/ pt today. Discussed benefits/positive/negative outcomes of screening based on age/risk stratification. Informed consent for/against screening based on pt's personal hx/risk factors. Updated in history above and health maintenance. Further follow up & other recommendations pending review of labs.    If all remains good and stable, follow up in 1 yr, sooner prn

## 2023-12-28 ENCOUNTER — OFFICE VISIT (OUTPATIENT)
Age: 58
End: 2023-12-28
Payer: COMMERCIAL

## 2023-12-28 VITALS
WEIGHT: 212.8 LBS | RESPIRATION RATE: 14 BRPM | TEMPERATURE: 97.6 F | OXYGEN SATURATION: 99 % | BODY MASS INDEX: 28.2 KG/M2 | SYSTOLIC BLOOD PRESSURE: 115 MMHG | HEART RATE: 60 BPM | DIASTOLIC BLOOD PRESSURE: 68 MMHG | HEIGHT: 73 IN

## 2023-12-28 DIAGNOSIS — Z20.828 EXPOSURE TO THE FLU: ICD-10-CM

## 2023-12-28 DIAGNOSIS — R05.1 ACUTE COUGH: Primary | ICD-10-CM

## 2023-12-28 DIAGNOSIS — J45.30 MILD PERSISTENT ASTHMA WITHOUT COMPLICATION: ICD-10-CM

## 2023-12-28 LAB
LOT EXPIRE DATE: NORMAL
LOT KIT NUMBER: NORMAL
QUICKVUE INFLUENZA TEST: NEGATIVE
SARS-COV-2, POC: NORMAL
VALID INTERNAL CONTROL, POC: YES
VALID INTERNAL CONTROL: YES
VENDOR AND KIT NAME POC: NORMAL

## 2023-12-28 PROCEDURE — 99214 OFFICE O/P EST MOD 30 MIN: CPT | Performed by: STUDENT IN AN ORGANIZED HEALTH CARE EDUCATION/TRAINING PROGRAM

## 2023-12-28 PROCEDURE — 87426 SARSCOV CORONAVIRUS AG IA: CPT | Performed by: STUDENT IN AN ORGANIZED HEALTH CARE EDUCATION/TRAINING PROGRAM

## 2023-12-28 PROCEDURE — 87804 INFLUENZA ASSAY W/OPTIC: CPT | Performed by: STUDENT IN AN ORGANIZED HEALTH CARE EDUCATION/TRAINING PROGRAM

## 2023-12-28 RX ORDER — ALBUTEROL SULFATE 90 UG/1
2 AEROSOL, METERED RESPIRATORY (INHALATION) EVERY 6 HOURS PRN
Qty: 18 G | Refills: 0 | Status: SHIPPED | OUTPATIENT
Start: 2023-12-28

## 2023-12-28 RX ORDER — OSELTAMIVIR PHOSPHATE 75 MG/1
75 CAPSULE ORAL 2 TIMES DAILY
Qty: 10 CAPSULE | Refills: 0 | Status: SHIPPED | OUTPATIENT
Start: 2023-12-28 | End: 2024-01-02

## 2024-05-10 ENCOUNTER — OFFICE VISIT (OUTPATIENT)
Age: 59
End: 2024-05-10
Payer: COMMERCIAL

## 2024-05-10 VITALS
BODY MASS INDEX: 27.57 KG/M2 | TEMPERATURE: 97.7 F | HEIGHT: 73 IN | OXYGEN SATURATION: 98 % | WEIGHT: 208 LBS | RESPIRATION RATE: 16 BRPM | SYSTOLIC BLOOD PRESSURE: 120 MMHG | DIASTOLIC BLOOD PRESSURE: 63 MMHG | HEART RATE: 76 BPM

## 2024-05-10 DIAGNOSIS — J06.9 UPPER RESPIRATORY TRACT INFECTION, UNSPECIFIED TYPE: Primary | ICD-10-CM

## 2024-05-10 DIAGNOSIS — J01.90 ACUTE BACTERIAL SINUSITIS: ICD-10-CM

## 2024-05-10 DIAGNOSIS — B96.89 ACUTE BACTERIAL SINUSITIS: ICD-10-CM

## 2024-05-10 LAB
EXP DATE SOLUTION: NORMAL
EXP DATE SWAB: NORMAL
EXPIRATION DATE: NORMAL
LOT NUMBER POC: NORMAL
LOT NUMBER SOLUTION: NORMAL
LOT NUMBER SWAB: NORMAL
QUICKVUE INFLUENZA TEST: NEGATIVE
SARS-COV-2 RNA, POC: NEGATIVE
VALID INTERNAL CONTROL, POC: YES

## 2024-05-10 PROCEDURE — 99213 OFFICE O/P EST LOW 20 MIN: CPT | Performed by: NURSE PRACTITIONER

## 2024-05-10 PROCEDURE — 87635 SARS-COV-2 COVID-19 AMP PRB: CPT | Performed by: NURSE PRACTITIONER

## 2024-05-10 PROCEDURE — 87804 INFLUENZA ASSAY W/OPTIC: CPT | Performed by: NURSE PRACTITIONER

## 2024-05-10 RX ORDER — AMOXICILLIN AND CLAVULANATE POTASSIUM 875; 125 MG/1; MG/1
1 TABLET, FILM COATED ORAL 2 TIMES DAILY
Qty: 14 TABLET | Refills: 0 | Status: SHIPPED | OUTPATIENT
Start: 2024-05-10 | End: 2024-05-17

## 2024-05-10 RX ORDER — BENZONATATE 200 MG/1
200 CAPSULE ORAL 3 TIMES DAILY PRN
Qty: 30 CAPSULE | Refills: 0 | Status: SHIPPED | OUTPATIENT
Start: 2024-05-10 | End: 2024-05-17

## 2024-05-10 NOTE — PROGRESS NOTES
Chief Complaint   Patient presents with    URI     Sore throat, drainage, cough x3 days       \"Have you been to the ER, urgent care clinic since your last visit?  Hospitalized since your last visit?\"    NO    “Have you seen or consulted any other health care providers outside of [facility] since your last visit?”    NO      Click Here for Release of Records Request          7/11/2023     9:01 AM   PHQ-9    Little interest or pleasure in doing things 0   Feeling down, depressed, or hopeless 0   PHQ-2 Score 0   PHQ-9 Total Score 0       Health Maintenance Due   Topic Date Due    HIV screen  Never done    Pneumococcal 0-64 years Vaccine (2 of 2 - PCV) 05/22/2020    COVID-19 Vaccine (5 - 2023-24 season) 09/01/2023    Respiratory Syncytial Virus (RSV) Pregnant or age 60 yrs+ (1 - 1-dose 60+ series) Never done     
9.6 oz)     Temp Readings from Last 3 Encounters:   05/10/24 97.7 °F (36.5 °C) (Infrared)   12/28/23 97.6 °F (36.4 °C) (Temporal)   07/11/23 98.1 °F (36.7 °C) (Oral)     BP Readings from Last 3 Encounters:   05/10/24 120/63   12/28/23 115/68   07/11/23 124/80     Pulse Readings from Last 3 Encounters:   05/10/24 76   12/28/23 60   07/11/23 68         Results  Laboratory Studies  Covid test is negative. Flu test is negative.        PHYSICAL EXAMINATION:     Physical Exam  Vitals and nursing note reviewed.   Constitutional:       Appearance: Normal appearance. He is not ill-appearing or toxic-appearing.   HENT:      Head: Normocephalic and atraumatic.      Ears:      Comments: Left tympanic membrane bulging serous fluid, right tympanic membrane retracted     Nose: Congestion present. No rhinorrhea.      Mouth/Throat:      Mouth: Mucous membranes are moist.      Pharynx: Oropharynx is clear. No oropharyngeal exudate or posterior oropharyngeal erythema.   Eyes:      General: No scleral icterus.        Right eye: No discharge.         Left eye: No discharge.      Extraocular Movements: Extraocular movements intact.      Conjunctiva/sclera: Conjunctivae normal.      Pupils: Pupils are equal, round, and reactive to light.   Cardiovascular:      Rate and Rhythm: Normal rate and regular rhythm.      Pulses: Normal pulses.      Heart sounds: Normal heart sounds. No murmur heard.     No gallop.   Pulmonary:      Effort: Pulmonary effort is normal.      Breath sounds: Normal breath sounds. No wheezing or rhonchi.   Musculoskeletal:         General: Normal range of motion.      Cervical back: Normal range of motion and neck supple.      Right lower leg: No edema.   Skin:     General: Skin is warm.      Capillary Refill: Capillary refill takes less than 2 seconds.   Neurological:      General: No focal deficit present.      Mental Status: He is alert and oriented to person, place, and time.                   Treatment

## 2024-06-24 ENCOUNTER — OFFICE VISIT (OUTPATIENT)
Age: 59
End: 2024-06-24
Payer: COMMERCIAL

## 2024-06-24 VITALS
HEART RATE: 55 BPM | HEIGHT: 72 IN | TEMPERATURE: 97.5 F | OXYGEN SATURATION: 99 % | DIASTOLIC BLOOD PRESSURE: 73 MMHG | BODY MASS INDEX: 27.17 KG/M2 | WEIGHT: 200.6 LBS | SYSTOLIC BLOOD PRESSURE: 144 MMHG

## 2024-06-24 DIAGNOSIS — Z12.5 SCREENING PSA (PROSTATE SPECIFIC ANTIGEN): ICD-10-CM

## 2024-06-24 DIAGNOSIS — R03.0 ELEVATED BLOOD PRESSURE READING WITHOUT DIAGNOSIS OF HYPERTENSION: ICD-10-CM

## 2024-06-24 DIAGNOSIS — Z00.00 ANNUAL PHYSICAL EXAM: Primary | ICD-10-CM

## 2024-06-24 DIAGNOSIS — E78.00 MILD HYPERCHOLESTEROLEMIA: ICD-10-CM

## 2024-06-24 DIAGNOSIS — E79.0 HYPERURICEMIA: ICD-10-CM

## 2024-06-24 LAB
ALBUMIN SERPL-MCNC: 4.1 G/DL (ref 3.5–5)
ALBUMIN/GLOB SERPL: 1.5 (ref 1.1–2.2)
ALP SERPL-CCNC: 108 U/L (ref 45–117)
ALT SERPL-CCNC: 32 U/L (ref 12–78)
ANION GAP SERPL CALC-SCNC: 5 MMOL/L (ref 5–15)
AST SERPL-CCNC: 27 U/L (ref 15–37)
BILIRUB SERPL-MCNC: 1 MG/DL (ref 0.2–1)
BUN SERPL-MCNC: 16 MG/DL (ref 6–20)
BUN/CREAT SERPL: 14 (ref 12–20)
CALCIUM SERPL-MCNC: 9.2 MG/DL (ref 8.5–10.1)
CHLORIDE SERPL-SCNC: 105 MMOL/L (ref 97–108)
CHOLEST SERPL-MCNC: 186 MG/DL
CO2 SERPL-SCNC: 27 MMOL/L (ref 21–32)
CREAT SERPL-MCNC: 1.12 MG/DL (ref 0.7–1.3)
ERYTHROCYTE [DISTWIDTH] IN BLOOD BY AUTOMATED COUNT: 13.2 % (ref 11.5–14.5)
EST. AVERAGE GLUCOSE BLD GHB EST-MCNC: 100 MG/DL
GLOBULIN SER CALC-MCNC: 2.8 G/DL (ref 2–4)
GLUCOSE SERPL-MCNC: 101 MG/DL (ref 65–100)
HBA1C MFR BLD: 5.1 % (ref 4–5.6)
HCT VFR BLD AUTO: 41.3 % (ref 36.6–50.3)
HDLC SERPL-MCNC: 50 MG/DL
HDLC SERPL: 3.7 (ref 0–5)
HGB BLD-MCNC: 14 G/DL (ref 12.1–17)
LDLC SERPL CALC-MCNC: 113.8 MG/DL (ref 0–100)
MCH RBC QN AUTO: 29.6 PG (ref 26–34)
MCHC RBC AUTO-ENTMCNC: 33.9 G/DL (ref 30–36.5)
MCV RBC AUTO: 87.3 FL (ref 80–99)
NRBC # BLD: 0 K/UL (ref 0–0.01)
NRBC BLD-RTO: 0 PER 100 WBC
PLATELET # BLD AUTO: 306 K/UL (ref 150–400)
PMV BLD AUTO: 10 FL (ref 8.9–12.9)
POTASSIUM SERPL-SCNC: 4.2 MMOL/L (ref 3.5–5.1)
PROT SERPL-MCNC: 6.9 G/DL (ref 6.4–8.2)
PSA SERPL-MCNC: 0.5 NG/ML (ref 0.01–4)
RBC # BLD AUTO: 4.73 M/UL (ref 4.1–5.7)
SODIUM SERPL-SCNC: 137 MMOL/L (ref 136–145)
TRIGL SERPL-MCNC: 111 MG/DL
TSH SERPL DL<=0.05 MIU/L-ACNC: 1.4 UIU/ML (ref 0.36–3.74)
URATE SERPL-MCNC: 6.3 MG/DL (ref 3.5–7.2)
VLDLC SERPL CALC-MCNC: 22.2 MG/DL
WBC # BLD AUTO: 5.6 K/UL (ref 4.1–11.1)

## 2024-06-24 PROCEDURE — 99396 PREV VISIT EST AGE 40-64: CPT | Performed by: FAMILY MEDICINE

## 2024-06-24 RX ORDER — CREATINE 100 %
POWDER (GRAM) MISCELLANEOUS
COMMUNITY

## 2024-06-24 ASSESSMENT — PATIENT HEALTH QUESTIONNAIRE - PHQ9
SUM OF ALL RESPONSES TO PHQ9 QUESTIONS 1 & 2: 0
5. POOR APPETITE OR OVEREATING: NOT AT ALL
3. TROUBLE FALLING OR STAYING ASLEEP: NOT AT ALL
7. TROUBLE CONCENTRATING ON THINGS, SUCH AS READING THE NEWSPAPER OR WATCHING TELEVISION: NOT AT ALL
SUM OF ALL RESPONSES TO PHQ QUESTIONS 1-9: 0
SUM OF ALL RESPONSES TO PHQ QUESTIONS 1-9: 0
2. FEELING DOWN, DEPRESSED OR HOPELESS: NOT AT ALL
1. LITTLE INTEREST OR PLEASURE IN DOING THINGS: NOT AT ALL
9. THOUGHTS THAT YOU WOULD BE BETTER OFF DEAD, OR OF HURTING YOURSELF: NOT AT ALL
SUM OF ALL RESPONSES TO PHQ QUESTIONS 1-9: 0
6. FEELING BAD ABOUT YOURSELF - OR THAT YOU ARE A FAILURE OR HAVE LET YOURSELF OR YOUR FAMILY DOWN: NOT AT ALL
10. IF YOU CHECKED OFF ANY PROBLEMS, HOW DIFFICULT HAVE THESE PROBLEMS MADE IT FOR YOU TO DO YOUR WORK, TAKE CARE OF THINGS AT HOME, OR GET ALONG WITH OTHER PEOPLE: NOT DIFFICULT AT ALL
4. FEELING TIRED OR HAVING LITTLE ENERGY: NOT AT ALL
SUM OF ALL RESPONSES TO PHQ QUESTIONS 1-9: 0
8. MOVING OR SPEAKING SO SLOWLY THAT OTHER PEOPLE COULD HAVE NOTICED. OR THE OPPOSITE, BEING SO FIGETY OR RESTLESS THAT YOU HAVE BEEN MOVING AROUND A LOT MORE THAN USUAL: NOT AT ALL

## 2024-06-24 ASSESSMENT — ENCOUNTER SYMPTOMS
GASTROINTESTINAL NEGATIVE: 1
EYES NEGATIVE: 1
RESPIRATORY NEGATIVE: 1
ALLERGIC/IMMUNOLOGIC NEGATIVE: 1

## 2024-06-24 NOTE — PROGRESS NOTES
Chief Complaint   Patient presents with    Annual Exam     \"Have you been to the ER, urgent care clinic since your last visit?  Hospitalized since your last visit?\"    NO    “Have you seen or consulted any other health care providers outside of [facility] since your last visit?”    NO                   7/11/2023     9:01 AM   PHQ-9    Little interest or pleasure in doing things 0   Feeling down, depressed, or hopeless 0   PHQ-2 Score 0   PHQ-9 Total Score 0           Financial Resource Strain: Low Risk  (7/11/2023)    Overall Financial Resource Strain (CARDIA)     Difficulty of Paying Living Expenses: Not hard at all      Food Insecurity: Not on file (7/11/2023)          Health Maintenance Due   Topic Date Due    HIV screen  Never done    Pneumococcal 0-64 years Vaccine (2 of 2 - PCV) 05/22/2020    COVID-19 Vaccine (5 - 2023-24 season) 09/01/2023    Respiratory Syncytial Virus (RSV) Pregnant or age 60 yrs+ (1 - 1-dose 60+ series) Never done    Depression Monitoring  07/11/2024

## 2024-06-24 NOTE — PROGRESS NOTES
Chief Complaint   Patient presents with    Annual Exam     Fasting     HISTORY OF PRESENT ILLNESS   HPI  Annual CPE  Fasting for labs  Employee wellness form for work  Complying routinely w/ medications updated below and tolerating w/o reaction or side effects  Diet: nothing special, admits eats a lot of carbs; not good about eating fruit or green veggies  Caffeine: glass of ice tea qam, Diet Coke at lunch, glass of tea w/ dinner; no coffee  Etoh: 2-3 drinks on Friday & Saturday only  Exercise: gym at work 3-5 x a week: elliptical x 10-15 minutes, weights (free wts, machines, circuits) x 1 hr   Weight: stable in the 200-210 lb range over the years       REVIEW OF SYMPTOMS   Review of Systems   Constitutional: Negative.    HENT: Negative.     Eyes: Negative.    Respiratory: Negative.     Cardiovascular: Negative.    Gastrointestinal: Negative.    Endocrine: Negative.    Genitourinary: Negative.    Musculoskeletal: Negative.    Allergic/Immunologic: Negative.    Neurological: Negative.    Hematological: Negative.    Psychiatric/Behavioral: Negative.               PROBLEM LIST/MEDICAL HISTORY     Patient Active Problem List    Diagnosis Date Noted    Environmental and seasonal allergies 07/11/2023    GERD with stricture 05/22/2019     Overview Note:     EGD, esophageal dilatation ~2012        Protrusion of lumbar intervertebral disc 05/15/2018     Overview Note:     MRI 2016: Mild multilevel disc and facet degenerative change focused at   L5-S1. Moderate broad-based protrusion at L5-S1 with mild canal stenosis   and mild   effacement of nerve is extending to both the right and left S1 foramina.        Mild persistent asthma without complication 05/15/2018    Hyperuricemia 05/15/2018    ELIO (obstructive sleep apnea) 05/15/2018     Overview Note:     ~2008; Intolerant of CPAP        DDD (degenerative disc disease), lumbar 05/15/2018    Chronic low back pain with left-sided sciatica 05/15/2018     Overview Note:     MRI

## 2024-07-01 DIAGNOSIS — F32.A CHRONIC DEPRESSION: ICD-10-CM

## 2024-07-01 RX ORDER — FLUOXETINE HYDROCHLORIDE 20 MG/1
20 CAPSULE ORAL DAILY
Qty: 90 CAPSULE | Refills: 2 | Status: SHIPPED | OUTPATIENT
Start: 2024-07-01

## 2024-07-01 NOTE — TELEPHONE ENCOUNTER
PCP: Shae Melendez MD    LOV: 6.24.24    No future appointments.    Requested Prescriptions     Pending Prescriptions Disp Refills    FLUoxetine (PROZAC) 20 MG capsule [Pharmacy Med Name: FLUOXETINE HCL CAPS 20MG] 90 capsule 3     Sig: TAKE 1 CAPSULE DAILY       Prior labs and Blood pressures:  BP Readings from Last 3 Encounters:   06/24/24 (!) 144/73   05/10/24 120/63   12/28/23 115/68     Lab Results   Component Value Date/Time     06/24/2024 11:20 AM    K 4.2 06/24/2024 11:20 AM     06/24/2024 11:20 AM    CO2 27 06/24/2024 11:20 AM    BUN 16 06/24/2024 11:20 AM    GFRAA >60 03/25/2022 08:34 AM     No results found for: \"HBA1C\", \"[id number]\"  Lab Results   Component Value Date/Time    CHOL 186 06/24/2024 11:20 AM    HDL 50 06/24/2024 11:20 AM    .8 06/24/2024 11:20 AM    .2 07/11/2023 09:35 AM    VLDL 22.2 06/24/2024 11:20 AM     No results found for: \"VITD3\"    Lab Results   Component Value Date/Time    TSH 1.34 03/25/2022 08:34 AM

## 2024-08-31 DIAGNOSIS — E79.0 HYPERURICEMIA: ICD-10-CM

## 2024-08-31 RX ORDER — ALLOPURINOL 100 MG/1
100 TABLET ORAL DAILY
Qty: 90 TABLET | Refills: 2 | Status: SHIPPED | OUTPATIENT
Start: 2024-08-31

## 2024-09-12 DIAGNOSIS — J30.89 ENVIRONMENTAL AND SEASONAL ALLERGIES: ICD-10-CM

## 2024-09-12 DIAGNOSIS — K22.2 GERD WITH STRICTURE: ICD-10-CM

## 2024-09-12 DIAGNOSIS — K27.9 PEPTIC ULCER DISEASE: ICD-10-CM

## 2024-09-12 DIAGNOSIS — K21.9 GERD WITH STRICTURE: ICD-10-CM

## 2024-09-12 DIAGNOSIS — J45.30 MILD PERSISTENT ASTHMA WITHOUT COMPLICATION: ICD-10-CM

## 2024-09-13 RX ORDER — MONTELUKAST SODIUM 10 MG/1
10 TABLET ORAL DAILY
Qty: 90 TABLET | Refills: 1 | Status: SHIPPED | OUTPATIENT
Start: 2024-09-13

## 2024-09-30 DIAGNOSIS — J45.30 MILD PERSISTENT ASTHMA WITHOUT COMPLICATION: ICD-10-CM

## 2024-09-30 NOTE — TELEPHONE ENCOUNTER
MD KRUGER,    Receiving refill request that patient requested from [facility] for Albuterol inhaler.      Please send to appropriate pharmacy.      ([facility] Or [facility] to [facility] as requested.)      Last appointment: 6/24/24 MD KRUGER  Next appointment:None  Previous refill encounter(s): 12/28/23 18g    Requested Prescriptions     Pending Prescriptions Disp Refills    albuterol sulfate HFA (PROVENTIL;VENTOLIN;PROAIR) 108 (90 Base) MCG/ACT inhaler 18 g 0     Sig: Inhale 2 puffs into the lungs every 6 hours as needed for Wheezing or Shortness of Breath     For Pharmacy Admin Tracking Only    Program: Medication Refill  CPA in place:    Recommendation Provided To:   Intervention Detail: New Rx: 1, reason: Patient Preference  Intervention Accepted By:   Gap Closed?:    Time Spent (min): 5

## 2024-10-01 RX ORDER — ALBUTEROL SULFATE 90 UG/1
2 INHALANT RESPIRATORY (INHALATION) EVERY 6 HOURS PRN
Qty: 18 G | Refills: 1 | Status: SHIPPED | OUTPATIENT
Start: 2024-10-01

## 2024-11-13 ENCOUNTER — TELEPHONE (OUTPATIENT)
Age: 59
End: 2024-11-13

## 2024-11-13 NOTE — TELEPHONE ENCOUNTER
Patient called checking the status of the [employer] message he sent over today.  Would like for the provider to give a call about the pain he is having.    Requesting a call back    Best call back #[phone]

## 2024-11-14 ENCOUNTER — OFFICE VISIT (OUTPATIENT)
Age: 59
End: 2024-11-14
Payer: COMMERCIAL

## 2024-11-14 VITALS
HEIGHT: 72 IN | DIASTOLIC BLOOD PRESSURE: 76 MMHG | TEMPERATURE: 97.7 F | RESPIRATION RATE: 18 BRPM | BODY MASS INDEX: 28.17 KG/M2 | HEART RATE: 62 BPM | SYSTOLIC BLOOD PRESSURE: 126 MMHG | OXYGEN SATURATION: 96 % | WEIGHT: 208 LBS

## 2024-11-14 DIAGNOSIS — K64.5 THROMBOSED EXTERNAL HEMORRHOID: Primary | ICD-10-CM

## 2024-11-14 PROCEDURE — 99213 OFFICE O/P EST LOW 20 MIN: CPT | Performed by: FAMILY MEDICINE

## 2024-11-14 RX ORDER — LIDOCAINE HYDROCHLORIDE AND HYDROCORTISONE ACETATE 30; 5 MG/G; MG/G
CREAM RECTAL 2 TIMES DAILY PRN
Qty: 28.3 G | Refills: 0 | Status: SHIPPED | OUTPATIENT
Start: 2024-11-14

## 2024-11-14 SDOH — ECONOMIC STABILITY: FOOD INSECURITY: WITHIN THE PAST 12 MONTHS, YOU WORRIED THAT YOUR FOOD WOULD RUN OUT BEFORE YOU GOT MONEY TO BUY MORE.: NEVER TRUE

## 2024-11-14 SDOH — ECONOMIC STABILITY: FOOD INSECURITY: WITHIN THE PAST 12 MONTHS, THE FOOD YOU BOUGHT JUST DIDN'T LAST AND YOU DIDN'T HAVE MONEY TO GET MORE.: NEVER TRUE

## 2024-11-14 SDOH — ECONOMIC STABILITY: INCOME INSECURITY: HOW HARD IS IT FOR YOU TO PAY FOR THE VERY BASICS LIKE FOOD, HOUSING, MEDICAL CARE, AND HEATING?: NOT HARD AT ALL

## 2024-11-14 ASSESSMENT — ENCOUNTER SYMPTOMS
DIARRHEA: 0
NAUSEA: 0
ANAL BLEEDING: 0
CONSTIPATION: 0
VOMITING: 0
ABDOMINAL PAIN: 0
RESPIRATORY NEGATIVE: 1
BLOOD IN STOOL: 0

## 2024-11-14 NOTE — PROGRESS NOTES
Chief Complaint   Patient presents with    Hemorrhoids     \"Have you been to the ER, urgent care clinic since your last visit?  Hospitalized since your last visit?\"    NO    “Have you seen or consulted any other health care providers outside of [facility] since your last visit?”    NO                   6/24/2024    10:48 AM   PHQ-9    Little interest or pleasure in doing things 0   Feeling down, depressed, or hopeless 0   Trouble falling or staying asleep, or sleeping too much 0   Feeling tired or having little energy 0   Poor appetite or overeating 0   Feeling bad about yourself - or that you are a failure or have let yourself or your family down 0   Trouble concentrating on things, such as reading the newspaper or watching television 0   Moving or speaking so slowly that other people could have noticed. Or the opposite - being so fidgety or restless that you have been moving around a lot more than usual 0   Thoughts that you would be better off dead, or of hurting yourself in some way 0   PHQ-2 Score 0   PHQ-9 Total Score 0   If you checked off any problems, how difficult have these problems made it for you to do your work, take care of things at home, or get along with other people? 0           Financial Resource Strain: Low Risk  (11/14/2024)    Overall Financial Resource Strain (CARDIA)     Difficulty of Paying Living Expenses: Not hard at all      Food Insecurity: No Food Insecurity (11/14/2024)    Hunger Vital Sign     Worried About Running Out of Food in the Last Year: Never true     Ran Out of Food in the Last Year: Never true          Health Maintenance Due   Topic Date Due    Pneumococcal 0-64 years Vaccine (2 of 2 - PCV) 05/22/2020    Respiratory Syncytial Virus (RSV) Pregnant or age 60 yrs+ (1 - 1-dose 60+ series) Never done    COVID-19 Vaccine (5 - 2023-24 season) 09/01/2024

## 2024-11-14 NOTE — PROGRESS NOTES
Chief Complaint   Patient presents with    Painful Hemorrhoid     HISTORY OF PRESENT ILLNESS   HPI  Patient states that 2 mornings ago after straining to have a BM he felt a hard marble sized tender lump in his anal area while showering.  It progressively enlarged throughout the day and ended up being about the size of a grape by later that afternoon.  It has continued to be very sore and tender to touch.  He continues to have painful bowel movements and discomfort with sitting.  There is been no rectal bleeding or blood on the toilet paper.  Minimal itching mostly just discomfort.  He has been using Preparation H and cleansing wipes.  Last night the pain was so bad he had to take a leftover hydrocodone.  He denies abdominal pain, nausea, vomiting, diarrhea, constipation, rectal bleeding, fevers, unintentional weight loss or other complaints.  Denies prior history of hemorrhoids or other anal/colorectal symptoms.  He states his bowel movements are very regular and that he typically has 1-3 soft bowel movements daily.  Been no changes in his bowel habits but he does admit that sometimes he does strain early in the morning to make sure he is completely evacuated before leaving for the gym. He had a normal colonoscopy 12/22/2014 and is scheduled for his next 10-year screening on 12/22/2024.  There is no family history of colorectal cancer or other colorectal diseases.     REVIEW OF SYMPTOMS   Review of Systems   Constitutional: Negative.    Respiratory: Negative.     Cardiovascular: Negative.    Gastrointestinal:  Negative for abdominal pain, anal bleeding, blood in stool, constipation, diarrhea, nausea and vomiting.   Neurological: Negative.              PROBLEM LIST/MEDICAL HISTORY     Patient Active Problem List    Diagnosis Date Noted    Environmental and seasonal allergies 07/11/2023    GERD with stricture 05/22/2019     Overview Note:     EGD, esophageal dilatation ~2012        Protrusion of lumbar

## 2025-03-10 DIAGNOSIS — K27.9 PEPTIC ULCER DISEASE: ICD-10-CM

## 2025-03-10 DIAGNOSIS — J45.30 MILD PERSISTENT ASTHMA WITHOUT COMPLICATION: ICD-10-CM

## 2025-03-10 DIAGNOSIS — K22.2 GERD WITH STRICTURE: ICD-10-CM

## 2025-03-10 DIAGNOSIS — J30.89 ENVIRONMENTAL AND SEASONAL ALLERGIES: ICD-10-CM

## 2025-03-10 DIAGNOSIS — K21.9 GERD WITH STRICTURE: ICD-10-CM

## 2025-03-11 RX ORDER — OMEPRAZOLE 20 MG/1
20 CAPSULE, DELAYED RELEASE ORAL DAILY
Qty: 90 CAPSULE | Refills: 1 | Status: SHIPPED | OUTPATIENT
Start: 2025-03-11

## 2025-03-11 RX ORDER — MONTELUKAST SODIUM 10 MG/1
10 TABLET ORAL DAILY
Qty: 90 TABLET | Refills: 1 | Status: SHIPPED | OUTPATIENT
Start: 2025-03-11

## 2025-03-11 NOTE — TELEPHONE ENCOUNTER
PCP: Shae Melendez MD    Last appt: 11/14/2024     Future Appointments   Date Time Provider Department Center   6/26/2025  8:00 AM Shae Melendez MD [facility] DEP       Requested Prescriptions     Pending Prescriptions Disp Refills    montelukast (SINGULAIR) 10 MG tablet [Pharmacy Med Name: MONTELUKAST SODIUM TABS 10MG] 90 tablet 3     Sig: TAKE 1 TABLET DAILY    omeprazole (PRILOSEC) 20 MG delayed release capsule [Pharmacy Med Name: OMEPRAZOLE DR CAPS 20MG] 90 capsule 3     Sig: TAKE 1 CAPSULE DAILY       Prior labs and Blood pressures:  BP Readings from Last 3 Encounters:   11/14/24 126/76   06/24/24 (!) 144/73   05/10/24 120/63     Lab Results   Component Value Date/Time     06/24/2024 11:20 AM    K 4.2 06/24/2024 11:20 AM     06/24/2024 11:20 AM    CO2 27 06/24/2024 11:20 AM    BUN 16 06/24/2024 11:20 AM    GFRAA >60 03/25/2022 08:34 AM     No results found for: \"HBA1C\", \"[id number]\"  Lab Results   Component Value Date/Time    CHOL 186 06/24/2024 11:20 AM    HDL 50 06/24/2024 11:20 AM    .8 06/24/2024 11:20 AM    .2 07/11/2023 09:35 AM    VLDL 22.2 06/24/2024 11:20 AM     No results found for: \"VITD3\"    Lab Results   Component Value Date/Time    TSH 1.40 06/24/2024 11:20 AM

## 2025-04-13 DIAGNOSIS — F32.A CHRONIC DEPRESSION: ICD-10-CM

## 2025-06-03 DIAGNOSIS — E79.0 HYPERURICEMIA: ICD-10-CM

## 2025-06-03 NOTE — TELEPHONE ENCOUNTER
PCP: Shae Melendez MD    Last appt: 11/14/2024     Future Appointments   Date Time Provider Department Center   6/26/2025  8:00 AM Shae Melendez MD [facility]       Requested Prescriptions     Pending Prescriptions Disp Refills    allopurinol (ZYLOPRIM) 100 MG tablet [Pharmacy Med Name: ALLOPURINOL TABS 100MG] 90 tablet 3     Sig: TAKE 1 TABLET DAILY       Prior labs and Blood pressures:  BP Readings from Last 3 Encounters:   11/14/24 126/76   06/24/24 (!) 144/73   05/10/24 120/63     Lab Results   Component Value Date/Time     06/24/2024 11:20 AM    K 4.2 06/24/2024 11:20 AM     06/24/2024 11:20 AM    CO2 27 06/24/2024 11:20 AM    BUN 16 06/24/2024 11:20 AM    GFRAA >60 03/25/2022 08:34 AM     No results found for: \"HBA1C\", \"[id number]\"  Lab Results   Component Value Date/Time    CHOL 186 06/24/2024 11:20 AM    HDL 50 06/24/2024 11:20 AM    .8 06/24/2024 11:20 AM    .2 07/11/2023 09:35 AM    VLDL 22.2 06/24/2024 11:20 AM     No results found for: \"VITD3\"    Lab Results   Component Value Date/Time    TSH 1.40 06/24/2024 11:20 AM

## 2025-06-04 RX ORDER — ALLOPURINOL 100 MG/1
100 TABLET ORAL DAILY
Qty: 90 TABLET | Refills: 0 | Status: SHIPPED | OUTPATIENT
Start: 2025-06-04

## 2025-06-26 ENCOUNTER — OFFICE VISIT (OUTPATIENT)
Age: 60
End: 2025-06-26
Payer: COMMERCIAL

## 2025-06-26 VITALS
HEIGHT: 73 IN | RESPIRATION RATE: 13 BRPM | BODY MASS INDEX: 26.27 KG/M2 | SYSTOLIC BLOOD PRESSURE: 98 MMHG | WEIGHT: 198.2 LBS | OXYGEN SATURATION: 96 % | TEMPERATURE: 97.9 F | HEART RATE: 65 BPM | DIASTOLIC BLOOD PRESSURE: 72 MMHG

## 2025-06-26 DIAGNOSIS — F32.A CHRONIC DEPRESSION: ICD-10-CM

## 2025-06-26 DIAGNOSIS — K27.9 PEPTIC ULCER DISEASE: ICD-10-CM

## 2025-06-26 DIAGNOSIS — Z00.00 ANNUAL PHYSICAL EXAM: Primary | ICD-10-CM

## 2025-06-26 DIAGNOSIS — J45.30 MILD PERSISTENT EXTRINSIC ASTHMA WITHOUT COMPLICATION: ICD-10-CM

## 2025-06-26 DIAGNOSIS — K22.2 GERD WITH STRICTURE: ICD-10-CM

## 2025-06-26 DIAGNOSIS — E79.0 HYPERURICEMIA: ICD-10-CM

## 2025-06-26 DIAGNOSIS — K21.9 GERD WITH STRICTURE: ICD-10-CM

## 2025-06-26 DIAGNOSIS — E78.00 MILD HYPERCHOLESTEROLEMIA: ICD-10-CM

## 2025-06-26 DIAGNOSIS — Z12.5 SCREENING PSA (PROSTATE SPECIFIC ANTIGEN): ICD-10-CM

## 2025-06-26 PROCEDURE — 99396 PREV VISIT EST AGE 40-64: CPT | Performed by: FAMILY MEDICINE

## 2025-06-26 SDOH — ECONOMIC STABILITY: FOOD INSECURITY: WITHIN THE PAST 12 MONTHS, YOU WORRIED THAT YOUR FOOD WOULD RUN OUT BEFORE YOU GOT MONEY TO BUY MORE.: NEVER TRUE

## 2025-06-26 SDOH — ECONOMIC STABILITY: FOOD INSECURITY: WITHIN THE PAST 12 MONTHS, THE FOOD YOU BOUGHT JUST DIDN'T LAST AND YOU DIDN'T HAVE MONEY TO GET MORE.: NEVER TRUE

## 2025-06-26 ASSESSMENT — PATIENT HEALTH QUESTIONNAIRE - PHQ9
8. MOVING OR SPEAKING SO SLOWLY THAT OTHER PEOPLE COULD HAVE NOTICED. OR THE OPPOSITE, BEING SO FIGETY OR RESTLESS THAT YOU HAVE BEEN MOVING AROUND A LOT MORE THAN USUAL: NOT AT ALL
5. POOR APPETITE OR OVEREATING: NOT AT ALL
3. TROUBLE FALLING OR STAYING ASLEEP: MORE THAN HALF THE DAYS
SUM OF ALL RESPONSES TO PHQ QUESTIONS 1-9: 2
2. FEELING DOWN, DEPRESSED OR HOPELESS: NOT AT ALL
6. FEELING BAD ABOUT YOURSELF - OR THAT YOU ARE A FAILURE OR HAVE LET YOURSELF OR YOUR FAMILY DOWN: NOT AT ALL
10. IF YOU CHECKED OFF ANY PROBLEMS, HOW DIFFICULT HAVE THESE PROBLEMS MADE IT FOR YOU TO DO YOUR WORK, TAKE CARE OF THINGS AT HOME, OR GET ALONG WITH OTHER PEOPLE: SOMEWHAT DIFFICULT
9. THOUGHTS THAT YOU WOULD BE BETTER OFF DEAD, OR OF HURTING YOURSELF: NOT AT ALL
SUM OF ALL RESPONSES TO PHQ QUESTIONS 1-9: 2
SUM OF ALL RESPONSES TO PHQ QUESTIONS 1-9: 2
7. TROUBLE CONCENTRATING ON THINGS, SUCH AS READING THE NEWSPAPER OR WATCHING TELEVISION: NOT AT ALL
1. LITTLE INTEREST OR PLEASURE IN DOING THINGS: NOT AT ALL
4. FEELING TIRED OR HAVING LITTLE ENERGY: NOT AT ALL
SUM OF ALL RESPONSES TO PHQ QUESTIONS 1-9: 2

## 2025-06-26 ASSESSMENT — ENCOUNTER SYMPTOMS
ALLERGIC/IMMUNOLOGIC NEGATIVE: 1
CONSTIPATION: 0
DIARRHEA: 0
ABDOMINAL PAIN: 0
GASTROINTESTINAL NEGATIVE: 1
VOMITING: 0
RESPIRATORY NEGATIVE: 1
BLOOD IN STOOL: 0
EYES NEGATIVE: 1
NAUSEA: 0

## 2025-06-26 NOTE — PROGRESS NOTES
Chief Complaint   Patient presents with    Annual Exam     Pt is fasting       1. \"Have you been to the ER, urgent care clinic since your last visit?  Hospitalized since your last visit?\" No    2. \"Have you seen or consulted any other health care providers outside of the [facility] since your last visit?\" No     3. For patients aged 45-75: Has the patient had a colonoscopy / FIT/ Cologuard? Yes - no Care Gap present, 12/23/2024, Normal, follow up 10 yrs, Dr Hu       If the patient is female:    4. For patients aged 40-74: Has the patient had a mammogram within the past 2 years? NA - based on age or sex      5. For patients aged 21-65: Has the patient had a pap smear? NA - based on age or sex              Click Here for Release of Records Request           6/26/2025     8:03 AM   PHQ-9    Little interest or pleasure in doing things 0   Feeling down, depressed, or hopeless 0   Trouble falling or staying asleep, or sleeping too much 2   Feeling tired or having little energy 0   Poor appetite or overeating 0   Feeling bad about yourself - or that you are a failure or have let yourself or your family down 0   Trouble concentrating on things, such as reading the newspaper or watching television 0   Moving or speaking so slowly that other people could have noticed. Or the opposite - being so fidgety or restless that you have been moving around a lot more than usual 0   Thoughts that you would be better off dead, or of hurting yourself in some way 0   PHQ-2 Score 0   PHQ-9 Total Score 2   If you checked off any problems, how difficult have these problems made it for you to do your work, take care of things at home, or get along with other people? 1           Financial Resource Strain: Low Risk  (11/14/2024)    Overall Financial Resource Strain (CARDIA)     Difficulty of Paying Living Expenses: Not hard at all      Food Insecurity: No Food Insecurity (6/26/2025)    Hunger Vital Sign     Worried About 
facility-administered medications for this visit.          ALLERGIES   No Known Allergies       SOCIAL HISTORY     Social History     Tobacco Use    Smoking status: Never     Passive exposure: Past    Smokeless tobacco: Never   Substance Use Topics    Alcohol use: Yes     Comment: 2-3 drinks on Friday & Saturday only     Social History     Social History Narrative        2 sons     Oldest rising kindra at [employer]    Youngest rising sophomore at [employer]    Wife Laure Villareal is a PAFP of Dr Fermin    Patient works full time as an  for [employer]     Enjoys [employer]     Social History     Substance and Sexual Activity   Sexual Activity Yes    Partners: Female    Birth control/protection: Surgical    Comment: Vasectomy        IMMUNIZATIONS     Immunization History   Administered Date(s) Administered    COVID-19, PFIZER Bivalent, DO NOT Dilute, (age 12y+), IM, 30 mcg/0.3 mL 2022    COVID-19, PFIZER PURPLE top, DILUTE for use, (age 12 y+), 30mcg/0.3mL 2021, 2021, 2021    Influenza Virus Vaccine 2020, 2024    Influenza, FLUARIX, FLULAVAL, FLUZONE (age 6 mo+) and AFLURIA, (age 3 y+), Quadv PF, 0.5mL 2020    Pneumococcal, PPSV23, PNEUMOVAX 23, (age 2y+), SC/IM, 0.5mL 2019    TDaP, ADACEL (age 10y-64y), BOOSTRIX (age 10y+), IM, 0.5mL 2011, 2021    Zoster Recombinant (Shingrix) 2021, 2022         FAMILY HISTORY     Family History   Problem Relation Age of Onset    Breast Cancer Mother         mastectomy     Cancer Mother 83        bladder cancer removed     Dementia Mother           age 85    Diabetes Father         Type 2    No Known Problems Sister     Prostate Cancer Brother 54        diagnosed at age 54 in     Stroke Maternal Aunt     Colon Cancer Neg Hx          VITALS   BP 98/72 (BP Site: Left Upper Arm, Patient Position: Sitting, BP Cuff Size: Large Adult)   Pulse 65   Temp 97.9 °F (36.6 °C) (Oral)

## 2025-06-27 ENCOUNTER — RESULTS FOLLOW-UP (OUTPATIENT)
Age: 60
End: 2025-06-27

## 2025-06-27 LAB
ALBUMIN SERPL-MCNC: 4.5 G/DL (ref 3.9–4.9)
ALP SERPL-CCNC: 109 IU/L (ref 44–121)
ALT SERPL-CCNC: 27 IU/L (ref 0–44)
AST SERPL-CCNC: 30 IU/L (ref 0–40)
BILIRUB SERPL-MCNC: 0.8 MG/DL (ref 0–1.2)
BUN SERPL-MCNC: 16 MG/DL (ref 8–27)
BUN/CREAT SERPL: 13 (ref 10–24)
CALCIUM SERPL-MCNC: 9.3 MG/DL (ref 8.6–10.2)
CHLORIDE SERPL-SCNC: 103 MMOL/L (ref 96–106)
CHOLEST SERPL-MCNC: 210 MG/DL (ref 100–199)
CO2 SERPL-SCNC: 21 MMOL/L (ref 20–29)
CREAT SERPL-MCNC: 1.24 MG/DL (ref 0.76–1.27)
EGFRCR SERPLBLD CKD-EPI 2021: 66 ML/MIN/1.73
ERYTHROCYTE [DISTWIDTH] IN BLOOD BY AUTOMATED COUNT: 13 % (ref 11.6–15.4)
GLOBULIN SER CALC-MCNC: 2.3 G/DL (ref 1.5–4.5)
GLUCOSE SERPL-MCNC: 96 MG/DL (ref 70–99)
HCT VFR BLD AUTO: 45.2 % (ref 37.5–51)
HDLC SERPL-MCNC: 56 MG/DL
HGB BLD-MCNC: 14.5 G/DL (ref 13–17.7)
IMP & REVIEW OF LAB RESULTS: NORMAL
LDLC SERPL CALC-MCNC: 138 MG/DL (ref 0–99)
MCH RBC QN AUTO: 29.4 PG (ref 26.6–33)
MCHC RBC AUTO-ENTMCNC: 32.1 G/DL (ref 31.5–35.7)
MCV RBC AUTO: 92 FL (ref 79–97)
PLATELET # BLD AUTO: 354 X10E3/UL (ref 150–450)
POTASSIUM SERPL-SCNC: 5.2 MMOL/L (ref 3.5–5.2)
PROT SERPL-MCNC: 6.8 G/DL (ref 6–8.5)
PSA SERPL-MCNC: 0.3 NG/ML (ref 0–4)
RBC # BLD AUTO: 4.94 X10E6/UL (ref 4.14–5.8)
SODIUM SERPL-SCNC: 139 MMOL/L (ref 134–144)
TRIGL SERPL-MCNC: 89 MG/DL (ref 0–149)
TSH SERPL DL<=0.005 MIU/L-ACNC: 1.84 UIU/ML (ref 0.45–4.5)
URATE SERPL-MCNC: 6.4 MG/DL (ref 3.8–8.4)
VLDLC SERPL CALC-MCNC: 16 MG/DL (ref 5–40)
WBC # BLD AUTO: 5.5 X10E3/UL (ref 3.4–10.8)

## 2025-07-14 DIAGNOSIS — F32.A CHRONIC DEPRESSION: ICD-10-CM

## 2025-07-15 NOTE — TELEPHONE ENCOUNTER
PCP: Shae Melendez MD    Last appt: 6/26/2025     No future appointments.    Requested Prescriptions     Pending Prescriptions Disp Refills    FLUoxetine (PROZAC) 20 MG capsule [Pharmacy Med Name: FLUOXETINE HCL CAPS 20MG] 90 capsule 3     Sig: TAKE 1 CAPSULE DAILY       Prior labs and Blood pressures:  BP Readings from Last 3 Encounters:   06/26/25 98/72   11/14/24 126/76   06/24/24 (!) 144/73     Lab Results   Component Value Date/Time     06/26/2025 12:00 AM    K 5.2 06/26/2025 12:00 AM     06/26/2025 12:00 AM    CO2 21 06/26/2025 12:00 AM    BUN 16 06/26/2025 12:00 AM    GFRAA >60 03/25/2022 08:34 AM     No results found for: \"HBA1C\", \"[id number]\"  Lab Results   Component Value Date/Time    CHOL 210 06/26/2025 12:00 AM    HDL 56 06/26/2025 12:00 AM     06/26/2025 12:00 AM    .2 07/11/2023 09:35 AM    VLDL 16 06/26/2025 12:00 AM     No results found for: \"VITD3\"    Lab Results   Component Value Date/Time    TSH 1.840 06/26/2025 12:00 AM    TSH 1.40 06/24/2024 11:20 AM

## 2025-08-26 DIAGNOSIS — J30.89 ENVIRONMENTAL AND SEASONAL ALLERGIES: ICD-10-CM

## 2025-08-26 DIAGNOSIS — J45.30 MILD PERSISTENT ASTHMA WITHOUT COMPLICATION: ICD-10-CM

## 2025-08-27 RX ORDER — MONTELUKAST SODIUM 10 MG/1
10 TABLET ORAL DAILY
Qty: 90 TABLET | Refills: 3 | Status: SHIPPED | OUTPATIENT
Start: 2025-08-27